# Patient Record
Sex: MALE | Race: WHITE | Employment: FULL TIME | URBAN - METROPOLITAN AREA
[De-identification: names, ages, dates, MRNs, and addresses within clinical notes are randomized per-mention and may not be internally consistent; named-entity substitution may affect disease eponyms.]

---

## 2017-01-01 ENCOUNTER — HOSPITAL ENCOUNTER (OUTPATIENT)
Dept: OTHER | Age: 42
Discharge: HOME | End: 2017-01-31
Attending: EMERGENCY MEDICINE | Admitting: EMERGENCY MEDICINE

## 2018-01-19 ENCOUNTER — OFFICE VISIT (OUTPATIENT)
Dept: INTERNAL MEDICINE CLINIC | Age: 43
End: 2018-01-19

## 2018-01-19 VITALS
SYSTOLIC BLOOD PRESSURE: 130 MMHG | HEART RATE: 82 BPM | WEIGHT: 223.4 LBS | OXYGEN SATURATION: 98 % | HEIGHT: 72 IN | DIASTOLIC BLOOD PRESSURE: 80 MMHG | BODY MASS INDEX: 30.26 KG/M2

## 2018-01-19 DIAGNOSIS — R68.82 DECREASED LIBIDO WITHOUT SEXUAL DYSFUNCTION: ICD-10-CM

## 2018-01-19 DIAGNOSIS — F17.200 CURRENT SMOKER: ICD-10-CM

## 2018-01-19 DIAGNOSIS — R53.82 CHRONIC FATIGUE: ICD-10-CM

## 2018-01-19 DIAGNOSIS — N52.9 ERECTILE DYSFUNCTION, UNSPECIFIED ERECTILE DYSFUNCTION TYPE: ICD-10-CM

## 2018-01-19 DIAGNOSIS — Z00.00 ROUTINE GENERAL MEDICAL EXAMINATION AT HEALTH CARE FACILITY: Primary | ICD-10-CM

## 2018-01-19 PROCEDURE — 99386 PREV VISIT NEW AGE 40-64: CPT | Performed by: NURSE PRACTITIONER

## 2018-01-19 RX ORDER — NICOTINE 21 MG/24HR
1 PATCH, TRANSDERMAL 24 HOURS TRANSDERMAL EVERY 24 HOURS
Qty: 30 PATCH | Refills: 0 | Status: SHIPPED | OUTPATIENT
Start: 2018-01-19 | End: 2018-03-16 | Stop reason: ALTCHOICE

## 2018-01-19 RX ORDER — ACETAMINOPHEN, ASPIRIN AND CAFFEINE 250; 250; 65 MG/1; MG/1; MG/1
1 TABLET, FILM COATED ORAL EVERY 6 HOURS PRN
COMMUNITY
End: 2021-07-27

## 2018-01-19 ASSESSMENT — PATIENT HEALTH QUESTIONNAIRE - PHQ9
1. LITTLE INTEREST OR PLEASURE IN DOING THINGS: 0
SUM OF ALL RESPONSES TO PHQ9 QUESTIONS 1 & 2: 0
SUM OF ALL RESPONSES TO PHQ QUESTIONS 1-9: 0
2. FEELING DOWN, DEPRESSED OR HOPELESS: 0

## 2018-01-19 NOTE — PATIENT INSTRUCTIONS
proven success. Ask your doctor for ideas. You will greatly increase your chances of success if you take medicine as well as get counseling or join a cessation program.  Some of the changes you feel when you first quit tobacco are uncomfortable. Your body will miss the nicotine at first, and you may feel short-tempered and grumpy. You may have trouble sleeping or concentrating. Medicine can help you deal with these symptoms. You may struggle with changing your smoking habits and rituals. The last step is the tricky one: Be prepared for the smoking urge to continue for a time. This is a lot to deal with, but keep at it. You will feel better. Follow-up care is a key part of your treatment and safety. Be sure to make and go to all appointments, and call your doctor if you are having problems. It's also a good idea to know your test results and keep a list of the medicines you take. How can you care for yourself at home? · Ask your family, friends, and coworkers for support. You have a better chance of quitting if you have help and support. · Join a support group, such as Nicotine Anonymous, for people who are trying to quit smoking. · Consider signing up for a smoking cessation program, such as the American Lung Association's Freedom from Smoking program.  · Set a quit date. Pick your date carefully so that it is not right in the middle of a big deadline or stressful time. Once you quit, do not even take a puff. Get rid of all ashtrays and lighters after your last cigarette. Clean your house and your clothes so that they do not smell of smoke. · Learn how to be a nonsmoker. Think about ways you can avoid those things that make you reach for a cigarette. ¨ Avoid situations that put you at greatest risk for smoking. For some people, it is hard to have a drink with friends without smoking. For others, they might skip a coffee break with coworkers who smoke. ¨ Change your daily routine.  Take a different route to work or eat a meal in a different place. · Cut down on stress. Calm yourself or release tension by doing an activity you enjoy, such as reading a book, taking a hot bath, or gardening. · Talk to your doctor or pharmacist about nicotine replacement therapy, which replaces the nicotine in your body. You still get nicotine but you do not use tobacco. Nicotine replacement products help you slowly reduce the amount of nicotine you need. These products come in several forms, many of them available over-the-counter:  ¨ Nicotine patches  ¨ Nicotine gum and lozenges  ¨ Nicotine inhaler  · Ask your doctor about bupropion (Wellbutrin) or varenicline (Chantix), which are prescription medicines. They do not contain nicotine. They help you by reducing withdrawal symptoms, such as stress and anxiety. · Some people find hypnosis, acupuncture, and massage helpful for ending the smoking habit. · Eat a healthy diet and get regular exercise. Having healthy habits will help your body move past its craving for nicotine. · Be prepared to keep trying. Most people are not successful the first few times they try to quit. Do not get mad at yourself if you smoke again. Make a list of things you learned and think about when you want to try again, such as next week, next month, or next year. Where can you learn more? Go to https://[place].Tellme. org and sign in to your [employer] account. Enter D573 in the [facility] box to learn more about \"Stopping Smoking: Care Instructions. \"     If you do not have an account, please click on the \"Sign Up Now\" link. Current as of: March 20, 2017  Content Version: 11.5  © 8246-9622 Healthwise, Incorporated. Care instructions adapted under license by [facility] ([facility]). If you have questions about a medical condition or this instruction, always ask your healthcare professional. [address] any warranty or liability for your use of this information.

## 2018-01-19 NOTE — PROGRESS NOTES
Arslan Lombardi   40 y.o.  male  [id number]      Chief Complaint   Patient presents with    New Patient     pt here to establish care-previously saw Dr. Shantelle Davalos <1yr ago      Subjective:  40 y.o.male is here as a New Patient to establish long-term care. He was previously seen by Dr. Shantelle Davalos. He is a . He has the following chronic/acute medical problems:      Patient Active Problem List   Diagnosis    Current smoker     Patient with complaints of fatigue, decreased libido, erectile dysfunction and \"feels like my body took a change\". Also with complaints of snoring. Onset was about 5 years ago. Denies fevers, shortness of breath, nausea, vomiting, night sweats. Feels like it may be his testosterone levels. Review of Systems    Current Outpatient Prescriptions   Medication Sig Dispense Refill    aspirin-acetaminophen-caffeine (EXCEDRIN MIGRAINE) 250-250-65 MG per tablet Take 1 tablet by mouth every 6 hours as needed for Headaches      nicotine (NICODERM CQ) 14 MG/24HR Place 1 patch onto the skin every 24 hours 30 patch 0     No current facility-administered medications for this visit. Allergies   Allergen Reactions    Percocet [Oxycodone-Acetaminophen] Hives     Past Medical History:   Diagnosis Date    Hypertension      Past Surgical History:   Procedure Laterality Date    ARM SURGERY Right 05/2017    muscle tear repair    TONSILLECTOMY      VASECTOMY       Family History   Problem Relation Age of Onset    Diabetes Mother     High Blood Pressure Mother     No Known Problems Sister     No Known Problems Brother     No Known Problems Sister     No Known Problems Brother      Social History     Social History    Marital status:      Spouse name: N/A    Number of children: N/A    Years of education: N/A     Occupational History    Not on file.      Social History Main Topics    Smoking status: Current Every Day Smoker     Packs/day: 0.75     Types: Cigarettes     Start Results   Component Value Date    WBC 7.7 10/26/2016    HGB 15.2 10/26/2016    HCT 45.2 10/26/2016    MCV 85.4 10/26/2016     10/26/2016     Lab Results   Component Value Date     10/26/2016    K 5.0 10/26/2016    CL 99 10/26/2016    CO2 28 10/26/2016    BUN 13 10/26/2016    CREATININE 1.2 10/26/2016    CALCIUM 9.8 10/26/2016    PROT 6.8 10/26/2016    LABALBU 4.7 10/26/2016    BILITOT 0.5 10/26/2016    ALKPHOS 48 10/26/2016    AST 22 10/26/2016    ALT 35 10/26/2016    LABGLOM >60 10/26/2016    GFRAA >60 10/26/2016     Lab Results   Component Value Date    CHOL 220 (H) 10/26/2016    CHOL 190 01/15/2016    CHOL 203 (H) 06/15/2015     Lab Results   Component Value Date    TRIG 192 (H) 10/26/2016    TRIG 221 (H) 01/15/2016    TRIG 259 (H) 06/15/2015     Lab Results   Component Value Date    HDL 47 10/26/2016    HDL 37 (L) 01/15/2016    HDL 37 (L) 06/15/2015     No results found for: LDLCALC, LDLCHOLESTEROL  No results found for: LABA1C  Lab Results   Component Value Date    TSHHS 1.670 10/26/2016       ASSESSMENT:      1. Routine general medical examination at health care facility    2. Chronic fatigue    3. Erectile dysfunction, unspecified erectile dysfunction type    4. Decreased libido without sexual dysfunction    5. Current smoker      PLAN:  Will get labs. Counseling on the following given:  nutrition, exercise and tobacco cessation. Preventative health discussed, recommend annual wellness visits. Refused pneumococcal and influenza vaccine. Would like to get Dtap at next visit. Yearly dental and eye exams recommended. May need referral to urology for ED. Also discussed sleep study may be warranted to assess for sleep apnea which could also cause his symptoms. However will start with labs first.  Pt motivated to stop smoking. WIll trial nicotine patches. Care discussed with patient. Questions answered. Patient verbalizes understanding and agrees with plan. After visit summary provided.

## 2018-01-23 DIAGNOSIS — R53.82 CHRONIC FATIGUE: ICD-10-CM

## 2018-01-23 DIAGNOSIS — Z00.00 ROUTINE GENERAL MEDICAL EXAMINATION AT HEALTH CARE FACILITY: ICD-10-CM

## 2018-01-23 DIAGNOSIS — R68.82 DECREASED LIBIDO WITHOUT SEXUAL DYSFUNCTION: ICD-10-CM

## 2018-01-23 LAB
A/G RATIO: 2.4 (ref 1.1–2.2)
ALBUMIN SERPL-MCNC: 4.8 G/DL (ref 3.4–5)
ALP BLD-CCNC: 55 U/L (ref 40–129)
ALT SERPL-CCNC: 23 U/L (ref 10–40)
ANION GAP SERPL CALCULATED.3IONS-SCNC: 12 MMOL/L (ref 3–16)
AST SERPL-CCNC: 16 U/L (ref 15–37)
BILIRUB SERPL-MCNC: 0.4 MG/DL (ref 0–1)
BUN BLDV-MCNC: 11 MG/DL (ref 7–20)
CALCIUM SERPL-MCNC: 9.6 MG/DL (ref 8.3–10.6)
CHLORIDE BLD-SCNC: 101 MMOL/L (ref 99–110)
CHOLESTEROL, TOTAL: 196 MG/DL (ref 0–199)
CO2: 28 MMOL/L (ref 21–32)
CREAT SERPL-MCNC: 0.9 MG/DL (ref 0.9–1.3)
GFR AFRICAN AMERICAN: >60
GFR NON-AFRICAN AMERICAN: >60
GLOBULIN: 2 G/DL
GLUCOSE BLD-MCNC: 111 MG/DL (ref 70–99)
HCT VFR BLD CALC: 47.6 % (ref 40.5–52.5)
HDLC SERPL-MCNC: 36 MG/DL (ref 40–60)
HEMOGLOBIN: 16.4 G/DL (ref 13.5–17.5)
LDL CHOLESTEROL CALCULATED: 117 MG/DL
MCH RBC QN AUTO: 29.6 PG (ref 26–34)
MCHC RBC AUTO-ENTMCNC: 34.3 G/DL (ref 31–36)
MCV RBC AUTO: 86.1 FL (ref 80–100)
PDW BLD-RTO: 13.5 % (ref 12.4–15.4)
PLATELET # BLD: 303 K/UL (ref 135–450)
PMV BLD AUTO: 8.7 FL (ref 5–10.5)
POTASSIUM SERPL-SCNC: 4.3 MMOL/L (ref 3.5–5.1)
RBC # BLD: 5.53 M/UL (ref 4.2–5.9)
SODIUM BLD-SCNC: 141 MMOL/L (ref 136–145)
TOTAL PROTEIN: 6.8 G/DL (ref 6.4–8.2)
TRIGL SERPL-MCNC: 214 MG/DL (ref 0–150)
TSH SERPL DL<=0.05 MIU/L-ACNC: 1.64 UIU/ML (ref 0.27–4.2)
VITAMIN D 25-HYDROXY: 21.2 NG/ML
VLDLC SERPL CALC-MCNC: 43 MG/DL
WBC # BLD: 7.2 K/UL (ref 4–11)

## 2018-01-25 LAB
SEX HORMONE BINDING GLOBULIN: 25 NMOL/L (ref 11–80)
TESTOSTERONE FREE-NONMALE: 81.8 PG/ML (ref 47–244)
TESTOSTERONE TOTAL: 351 NG/DL (ref 220–1000)

## 2018-03-16 ENCOUNTER — OFFICE VISIT (OUTPATIENT)
Dept: INTERNAL MEDICINE CLINIC | Age: 43
End: 2018-03-16

## 2018-03-16 VITALS
OXYGEN SATURATION: 97 % | BODY MASS INDEX: 25.95 KG/M2 | WEIGHT: 190 LBS | DIASTOLIC BLOOD PRESSURE: 72 MMHG | SYSTOLIC BLOOD PRESSURE: 118 MMHG | HEART RATE: 64 BPM

## 2018-03-16 DIAGNOSIS — K43.9 EPIGASTRIC HERNIA: Primary | ICD-10-CM

## 2018-03-16 DIAGNOSIS — R68.82 DECREASED LIBIDO: ICD-10-CM

## 2018-03-16 PROCEDURE — 99213 OFFICE O/P EST LOW 20 MIN: CPT | Performed by: NURSE PRACTITIONER

## 2018-03-16 NOTE — PROGRESS NOTES
Renny Bullock   44 y.o.  male  [id number]    Chief Complaint   Patient presents with    Other     8 week f/u-wants to discuss vitamins        Subjective:  43 y.o.male is here for a follow up. He has the following chronic/acute medical problems:     Patient Active Problem List   Diagnosis    Epigastric hernia    Decreased libido     Patient stopped smoking 2 months ago and started working out/eating healthier. He has lost 33lbs since last OV. He states he is sleeping better and stopped snoring. He is still having problems with low libido and ED. He would like to avoid medication and is asking about P6 to help increase testosterone. Patient also with complaints of a \"bulge\" in his epigastric area. States he has felt is for awhile but started to notice it more since loosing weight. Denies pain, nausea or vomiting. Review of Systems    Current Outpatient Prescriptions   Medication Sig Dispense Refill    aspirin-acetaminophen-caffeine (EXCEDRIN MIGRAINE) 250-250-65 MG per tablet Take 1 tablet by mouth every 6 hours as needed for Headaches       No current facility-administered medications for this visit. Objective:  /72 (Site: Left Arm, Position: Sitting, Cuff Size: Medium Adult)   Pulse 64   Wt 190 lb (86.2 kg)   SpO2 97%   BMI 25.95 kg/m²   BP Readings from Last 3 Encounters:   03/16/18 118/72   01/19/18 130/80   08/18/14 (!) 146/101     Wt Readings from Last 3 Encounters:   03/16/18 190 lb (86.2 kg)   01/19/18 223 lb 6.4 oz (101.3 kg)   08/18/14 218 lb (98.9 kg)       Physical Exam   Constitutional: He is oriented to person, place, and time. He appears well-developed and well-nourished. Eyes: Conjunctivae are normal. No scleral icterus. Cardiovascular: Normal rate, regular rhythm and normal heart sounds. No murmur heard. Pulmonary/Chest: Effort normal and breath sounds normal. No respiratory distress. He has no wheezes. Abdominal: Soft. A hernia is present. 9/16/2018).     Xiomara Lawson NP  03/18/18  4:20 PM

## 2018-03-18 PROBLEM — R68.82 DECREASED LIBIDO: Status: ACTIVE | Noted: 2018-03-18

## 2018-03-18 PROBLEM — F17.200 CURRENT SMOKER: Status: RESOLVED | Noted: 2018-01-19 | Resolved: 2018-03-18

## 2018-03-18 PROBLEM — K43.9 EPIGASTRIC HERNIA: Status: ACTIVE | Noted: 2018-03-18

## 2018-03-19 ENCOUNTER — TELEPHONE (OUTPATIENT)
Dept: INTERNAL MEDICINE CLINIC | Age: 43
End: 2018-03-19

## 2018-04-13 ENCOUNTER — E-VISIT (OUTPATIENT)
Dept: FAMILY MEDICINE CLINIC | Age: 43
End: 2018-04-13
Payer: COMMERCIAL

## 2018-04-13 DIAGNOSIS — J06.9 UPPER RESPIRATORY TRACT INFECTION, UNSPECIFIED TYPE: Primary | ICD-10-CM

## 2018-04-13 DIAGNOSIS — B34.9 VIRAL ILLNESS: Primary | ICD-10-CM

## 2018-04-13 PROCEDURE — 98969 PR NONPHYSICIAN ONLINE ASSESSMENT AND MANAGEMENT: CPT | Performed by: NURSE PRACTITIONER

## 2018-04-13 RX ORDER — CETIRIZINE HYDROCHLORIDE 10 MG/1
10 TABLET ORAL DAILY
Qty: 30 TABLET | Refills: 0 | Status: SHIPPED | OUTPATIENT
Start: 2018-04-13 | End: 2021-07-27

## 2018-04-13 RX ORDER — FLUTICASONE PROPIONATE 50 MCG
1 SPRAY, SUSPENSION (ML) NASAL DAILY
Qty: 1 BOTTLE | Refills: 0 | Status: SHIPPED | OUTPATIENT
Start: 2018-04-13 | End: 2021-07-27

## 2018-04-13 RX ORDER — AMOXICILLIN 500 MG/1
1 TABLET, FILM COATED ORAL 2 TIMES DAILY
Qty: 14 TABLET | Refills: 0 | Status: SHIPPED | OUTPATIENT
Start: 2018-04-13 | End: 2018-04-20

## 2018-04-13 ASSESSMENT — LIFESTYLE VARIABLES: SMOKING_STATUS: NO

## 2018-11-24 ENCOUNTER — APPOINTMENT (OUTPATIENT)
Dept: CT IMAGING | Age: 43
End: 2018-11-24
Payer: COMMERCIAL

## 2018-11-24 ENCOUNTER — APPOINTMENT (OUTPATIENT)
Dept: GENERAL RADIOLOGY | Age: 43
End: 2018-11-24
Payer: COMMERCIAL

## 2018-11-24 ENCOUNTER — HOSPITAL ENCOUNTER (EMERGENCY)
Age: 43
Discharge: HOME | End: 2018-11-24
Payer: COMMERCIAL

## 2018-11-24 VITALS
WEIGHT: 214 LBS | RESPIRATION RATE: 18 BRPM | HEART RATE: 77 BPM | OXYGEN SATURATION: 98 % | SYSTOLIC BLOOD PRESSURE: 156 MMHG | DIASTOLIC BLOOD PRESSURE: 102 MMHG | BODY MASS INDEX: 28.99 KG/M2 | HEIGHT: 72 IN | TEMPERATURE: 98.5 F

## 2018-11-24 DIAGNOSIS — R10.9 FLANK PAIN: ICD-10-CM
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
## 2018-11-24 DIAGNOSIS — S16.1XXA STRAIN OF NECK MUSCLE, INITIAL ENCOUNTER: ICD-10-CM

## 2018-11-24 DIAGNOSIS — R07.81 RIB PAIN ON RIGHT SIDE: ICD-10-CM

## 2018-11-24 DIAGNOSIS — M25.551 RIGHT HIP PAIN: ICD-10-CM

## 2018-11-24 DIAGNOSIS — S09.90XA INJURY OF HEAD, INITIAL ENCOUNTER: ICD-10-CM

## 2018-11-24 PROCEDURE — 99284 EMERGENCY DEPT VISIT MOD MDM: CPT

## 2018-11-24 PROCEDURE — 93005 ELECTROCARDIOGRAM TRACING: CPT | Performed by: PHYSICIAN ASSISTANT

## 2018-11-24 PROCEDURE — 73501 X-RAY EXAM HIP UNI 1 VIEW: CPT

## 2018-11-24 PROCEDURE — 70450 CT HEAD/BRAIN W/O DYE: CPT

## 2018-11-24 PROCEDURE — 72125 CT NECK SPINE W/O DYE: CPT

## 2018-11-24 PROCEDURE — 71101 X-RAY EXAM UNILAT RIBS/CHEST: CPT

## 2018-11-24 RX ORDER — CYCLOBENZAPRINE HCL 10 MG
10 TABLET ORAL 3 TIMES DAILY PRN
Qty: 20 TABLET | Refills: 0 | Status: SHIPPED | OUTPATIENT
Start: 2018-11-24 | End: 2018-12-04

## 2018-11-24 RX ORDER — IBUPROFEN 600 MG/1
600 TABLET ORAL EVERY 6 HOURS PRN
Qty: 28 TABLET | Refills: 0 | Status: SHIPPED | OUTPATIENT
Start: 2018-11-24 | End: 2020-06-29

## 2018-11-24 ASSESSMENT — PAIN DESCRIPTION - LOCATION: LOCATION: CHEST;HIP

## 2018-11-24 ASSESSMENT — PAIN DESCRIPTION - PAIN TYPE: TYPE: ACUTE PAIN

## 2018-11-24 ASSESSMENT — PAIN SCALES - GENERAL: PAINLEVEL_OUTOF10: 8
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
## 2018-11-25 PROCEDURE — 93010 ELECTROCARDIOGRAM REPORT: CPT | Performed by: INTERNAL MEDICINE

## 2018-11-28 LAB
EKG ATRIAL RATE: 78 BPM
EKG DIAGNOSIS: NORMAL
EKG P AXIS: 40 DEGREES
EKG P-R INTERVAL: 196 MS
EKG Q-T INTERVAL: 366 MS
EKG QRS DURATION: 92 MS
EKG QTC CALCULATION (BAZETT): 417 MS
EKG R AXIS: 4 DEGREES
EKG T AXIS: 34 DEGREES
EKG VENTRICULAR RATE: 78 BPM

## 2018-12-11 ENCOUNTER — TELEPHONE (OUTPATIENT)
Dept: INTERNAL MEDICINE CLINIC | Age: 43
End: 2018-12-11

## 2020-06-29 ENCOUNTER — HOSPITAL ENCOUNTER (EMERGENCY)
Age: 45
Discharge: HOME | End: 2020-06-29
Payer: COMMERCIAL

## 2020-06-29 ENCOUNTER — APPOINTMENT (OUTPATIENT)
Dept: GENERAL RADIOLOGY | Age: 45
End: 2020-06-29
Payer: COMMERCIAL

## 2020-06-29 VITALS
SYSTOLIC BLOOD PRESSURE: 165 MMHG | BODY MASS INDEX: 30.34 KG/M2 | TEMPERATURE: 98.1 F | DIASTOLIC BLOOD PRESSURE: 106 MMHG | RESPIRATION RATE: 16 BRPM | HEIGHT: 72 IN | WEIGHT: 224 LBS | HEART RATE: 79 BPM | OXYGEN SATURATION: 98 %

## 2020-06-29 LAB
ALBUMIN SERPL-MCNC: 4.3 GM/DL (ref 3.4–5)
ALP BLD-CCNC: 61 IU/L (ref 40–129)
ALT SERPL-CCNC: 16 U/L (ref 10–40)
ANION GAP SERPL CALCULATED.3IONS-SCNC: 11 MMOL/L (ref 4–16)
AST SERPL-CCNC: 16 IU/L (ref 15–37)
BACTERIA: NEGATIVE /HPF
BASOPHILS ABSOLUTE: 0.1 K/CU MM
BASOPHILS RELATIVE PERCENT: 0.7 % (ref 0–1)
BILIRUB SERPL-MCNC: 0.3 MG/DL (ref 0–1)
BILIRUBIN URINE: NEGATIVE MG/DL
BLOOD, URINE: NEGATIVE
BUN BLDV-MCNC: 13 MG/DL (ref 6–23)
CALCIUM SERPL-MCNC: 9.7 MG/DL (ref 8.3–10.6)
CHLORIDE BLD-SCNC: 100 MMOL/L (ref 99–110)
CLARITY: CLEAR
CO2: 25 MMOL/L (ref 21–32)
COLOR: ABNORMAL
CREAT SERPL-MCNC: 1 MG/DL (ref 0.9–1.3)
DIFFERENTIAL TYPE: ABNORMAL
EOSINOPHILS ABSOLUTE: 0.2 K/CU MM
EOSINOPHILS RELATIVE PERCENT: 1.8 % (ref 0–3)
GFR AFRICAN AMERICAN: >60 ML/MIN/1.73M2
GFR NON-AFRICAN AMERICAN: >60 ML/MIN/1.73M2
GLUCOSE BLD-MCNC: 106 MG/DL (ref 70–99)
GLUCOSE, URINE: NEGATIVE MG/DL
HCT VFR BLD CALC: 45.4 % (ref 42–52)
HEMOGLOBIN: 15 GM/DL (ref 13.5–18)
IMMATURE NEUTROPHIL %: 0.4 % (ref 0–0.43)
KETONES, URINE: NEGATIVE MG/DL
LEUKOCYTE ESTERASE, URINE: NEGATIVE
LIPASE: 19 IU/L (ref 13–60)
LYMPHOCYTES ABSOLUTE: 1.7 K/CU MM
LYMPHOCYTES RELATIVE PERCENT: 19.5 % (ref 24–44)
MAGNESIUM: 2.1 MG/DL (ref 1.8–2.4)
MCH RBC QN AUTO: 28.4 PG (ref 27–31)
MCHC RBC AUTO-ENTMCNC: 33 % (ref 32–36)
MCV RBC AUTO: 86 FL (ref 78–100)
MONOCYTES ABSOLUTE: 0.7 K/CU MM
MONOCYTES RELATIVE PERCENT: 8.3 % (ref 0–4)
MUCUS: ABNORMAL HPF
NITRITE URINE, QUANTITATIVE: NEGATIVE
NUCLEATED RBC %: 0 %
PDW BLD-RTO: 12.2 % (ref 11.7–14.9)
PH, URINE: 6 (ref 5–8)
PLATELET # BLD: 337 K/CU MM (ref 140–440)
PMV BLD AUTO: 9 FL (ref 7.5–11.1)
POTASSIUM SERPL-SCNC: 4.3 MMOL/L (ref 3.5–5.1)
PRO-BNP: 43.63 PG/ML
PROTEIN UA: NEGATIVE MG/DL
RBC # BLD: 5.28 M/CU MM (ref 4.6–6.2)
RBC URINE: ABNORMAL /HPF (ref 0–3)
SEGMENTED NEUTROPHILS ABSOLUTE COUNT: 5.9 K/CU MM
SEGMENTED NEUTROPHILS RELATIVE PERCENT: 69.3 % (ref 36–66)
SODIUM BLD-SCNC: 136 MMOL/L (ref 135–145)
SPECIFIC GRAVITY UA: 1 (ref 1–1.03)
TOTAL CK: 102 IU/L (ref 38–174)
TOTAL IMMATURE NEUTOROPHIL: 0.03 K/CU MM
TOTAL NUCLEATED RBC: 0 K/CU MM
TOTAL PROTEIN: 7.7 GM/DL (ref 6.4–8.2)
TRICHOMONAS: ABNORMAL /HPF
TROPONIN T: <0.01 NG/ML
UROBILINOGEN, URINE: NORMAL MG/DL (ref 0.2–1)
WBC # BLD: 8.5 K/CU MM (ref 4–10.5)
WBC UA: ABNORMAL /HPF (ref 0–2)

## 2020-06-29 PROCEDURE — 81001 URINALYSIS AUTO W/SCOPE: CPT

## 2020-06-29 PROCEDURE — 83735 ASSAY OF MAGNESIUM: CPT

## 2020-06-29 PROCEDURE — 84443 ASSAY THYROID STIM HORMONE: CPT

## 2020-06-29 PROCEDURE — 93005 ELECTROCARDIOGRAM TRACING: CPT | Performed by: EMERGENCY MEDICINE

## 2020-06-29 PROCEDURE — 96374 THER/PROPH/DIAG INJ IV PUSH: CPT

## 2020-06-29 PROCEDURE — 85025 COMPLETE CBC W/AUTO DIFF WBC: CPT

## 2020-06-29 PROCEDURE — 2580000003 HC RX 258: Performed by: PHYSICIAN ASSISTANT

## 2020-06-29 PROCEDURE — 82550 ASSAY OF CK (CPK): CPT

## 2020-06-29 PROCEDURE — 71045 X-RAY EXAM CHEST 1 VIEW: CPT

## 2020-06-29 PROCEDURE — 80053 COMPREHEN METABOLIC PANEL: CPT

## 2020-06-29 PROCEDURE — 84484 ASSAY OF TROPONIN QUANT: CPT

## 2020-06-29 PROCEDURE — 6360000002 HC RX W HCPCS: Performed by: PHYSICIAN ASSISTANT

## 2020-06-29 PROCEDURE — 99283 EMERGENCY DEPT VISIT LOW MDM: CPT

## 2020-06-29 PROCEDURE — 83880 ASSAY OF NATRIURETIC PEPTIDE: CPT

## 2020-06-29 PROCEDURE — 83690 ASSAY OF LIPASE: CPT

## 2020-06-29 RX ORDER — KETOROLAC TROMETHAMINE 10 MG/1
10 TABLET, FILM COATED ORAL EVERY 6 HOURS PRN
Qty: 20 TABLET | Refills: 0 | Status: SHIPPED | OUTPATIENT
Start: 2020-06-29 | End: 2021-07-27 | Stop reason: ALTCHOICE

## 2020-06-29 RX ORDER — 0.9 % SODIUM CHLORIDE 0.9 %
1000 INTRAVENOUS SOLUTION INTRAVENOUS ONCE
Status: COMPLETED | OUTPATIENT
Start: 2020-06-29 | End: 2020-06-29

## 2020-06-29 RX ORDER — KETOROLAC TROMETHAMINE 30 MG/ML
15 INJECTION, SOLUTION INTRAMUSCULAR; INTRAVENOUS ONCE
Status: COMPLETED | OUTPATIENT
Start: 2020-06-29 | End: 2020-06-29

## 2020-06-29 RX ORDER — KETOROLAC TROMETHAMINE 10 MG/1
10 TABLET, FILM COATED ORAL EVERY 6 HOURS PRN
Qty: 20 TABLET | Refills: 0 | Status: SHIPPED | OUTPATIENT
Start: 2020-06-29 | End: 2020-06-29 | Stop reason: SDUPTHER

## 2020-06-29 RX ADMIN — KETOROLAC TROMETHAMINE 15 MG: 30 INJECTION, SOLUTION INTRAMUSCULAR at 11:43

## 2020-06-29 RX ADMIN — SODIUM CHLORIDE 1000 ML: 9 INJECTION, SOLUTION INTRAVENOUS at 11:43

## 2020-06-29 ASSESSMENT — PAIN DESCRIPTION - PAIN TYPE: TYPE: ACUTE PAIN

## 2020-06-29 ASSESSMENT — PAIN SCALES - GENERAL
PAINLEVEL_OUTOF10: 9
PAINLEVEL_OUTOF10: 9

## 2020-06-29 NOTE — ED PROVIDER NOTES
Patient Identification  Lan Marcos is a 41 y.o. male    Chief Complaint  Spasms (states a week ago that he was outside working and feels that he was dehydrated; started having these muscle cramps primarily in legs and abdominal area and states that they have continued) and Chest Pain (states that he is having muscle cramps in his chest; states that it feels tight)      HPI  (History provided by patient)  This is a 41 y.o. male who was brought in by self for chief complaint of muscle cramps, chest pain. Patient reports a week ago he was de-constructing a barn, states that it was very hot outside, he felt as though he is exerting himself significantly, wife came out and told him to come inside because he \"did not look good\". States that after he got inside he began having cramping in his legs and abdomen intermittently. He reports he is continued to have cramps in his legs and abdomen as well as throughout his chest since that time that are waxing and waning but constant. He has been hydrating at home, urine is light yellow, no syncope, has been drinking Pedialyte. Cramps are worse when he tries to go to work. He denies any medical problems. Pain is worst in the right leg and chest, 9 out of 10, cramping. He has tried Tylenol and ibuprofen and muscle relaxers and lidocaine patches without relief. REVIEW OF SYSTEMS    Constitutional:  Denies fever, chills  HENT:  Denies sore throat or ear pain   Eyes: Denies vision changes, eye pain  Cardiovascular:  Denies syncope.  + CP  Respiratory:  Denies shortness of breath, cough   GI:  Denies nausea, vomiting.  + abdominal pain  :  Denies dysuria, discharge  Musculoskeletal:  + back pain, leg pain  Skin:  Denies rash, pruritis  Neurologic:  Denies headache, focal weakness, or sensory changes     See HPI and nursing notes for additional information     I have reviewed the following nursing documentation:  Allergies:    Allergies   Allergen Reactions    Supple. Trachea midline. Cardiovascular: RRR, no murmurs, rubs, or gallops, radial pulses 2+ bilaterally. Pulmonary/Chest: Effort normal. No respiratory distress. CTAB. No stridor. No wheezes. No rales. Abdominal: Soft. Tender to palpation in right upper quadrant and right flank. No distension. No guarding, rebound tenderness, or evidence of ascites. : No CVA tenderness. Musculoskeletal: Moves all extremities. No gross deformity. Tender to palpation throughout the right leg, the bilateral lower back, right flank, right chest wall which directly reproduces patient's pain. Neurological: Alert and oriented to person, place, and time. Normal muscle tone. Skin: Warm and dry. No rash. Psychiatric: Normal mood and affect. Behavior is normal.      EKG   Please see Dr. Sierra Rice note for EKG read. Radiographs (if obtained):  [] The following radiograph was interpreted by myself in the absence of a radiologist:   [x] Radiologist's Report Reviewed:  XR CHEST PORTABLE   Final Result   1. No acute cardiopulmonary disease.                 Labs  Results for orders placed or performed during the hospital encounter of 06/29/20   CBC auto diff   Result Value Ref Range    WBC 8.5 4.0 - 10.5 K/CU MM    RBC 5.28 4.6 - 6.2 M/CU MM    Hemoglobin 15.0 13.5 - 18.0 GM/DL    Hematocrit 45.4 42 - 52 %    MCV 86.0 78 - 100 FL    MCH 28.4 27 - 31 PG    MCHC 33.0 32.0 - 36.0 %    RDW 12.2 11.7 - 14.9 %    Platelets 101 889 - 064 K/CU MM    MPV 9.0 7.5 - 11.1 FL    Differential Type AUTOMATED DIFFERENTIAL     Segs Relative 69.3 (H) 36 - 66 %    Lymphocytes % 19.5 (L) 24 - 44 %    Monocytes % 8.3 (H) 0 - 4 %    Eosinophils % 1.8 0 - 3 %    Basophils % 0.7 0 - 1 %    Segs Absolute 5.9 K/CU MM    Lymphocytes Absolute 1.7 K/CU MM    Monocytes Absolute 0.7 K/CU MM    Eosinophils Absolute 0.2 K/CU MM    Basophils Absolute 0.1 K/CU MM    Nucleated RBC % 0.0 %    Total Nucleated RBC 0.0 K/CU MM    Total Immature Neutrophil sinus rhythm  Normal ECG  When compared with ECG of 24-NOV-2018 15:01,  No significant change was found           MDM  Patient presents for diffuse body pain most prominent in the right leg, flank, chest.  Started after exerting himself while taking down a barn, pain reproduced on exam.  Workup here reveals no evidence of cardiac injury, JUAN M, electrolyte abnormality, rhabdomyolysis. Discussed results with patient, he is feeling much better after Toradol and fluids, plan is to discharge with oral Toradol, increase fluids, instructed on rest.  Clinical impression is musculoskeletal pain from overexertion. I estimate there is LOW risk for PULMONARY EMBOLISM, ACUTE CORONARY SYNDROME, CARDIAC TAMPONADE, PNEUMOTHORAX, PNEUMONIA, PERICARDITIS, OR THORACIC AORTIC DISSECTION, thus I consider the discharge disposition reasonable. Beatrice Sylvester and I have discussed the diagnosis and risks, and we agree with discharging home to follow-up with their primary doctor. We also discussed returning to the Emergency Department immediately if new or worsening symptoms occur. We have discussed the symptoms which are most concerning (e.g., bloody sputum, fever, worsening pain or worsening shortness of breath, vomiting, sweating) that necessitate immediate return. I have independently evaluated this patient. Final Impression  1. Musculoskeletal pain        Blood pressure (!) 165/106, pulse 79, temperature 98.1 °F (36.7 °C), temperature source Oral, resp. rate 16, height 6' (1.829 m), weight 224 lb (101.6 kg), SpO2 98 %. Disposition:  Discharge to home in stable condition. Patient was given scripts for the following medications. I counseled patient how to take these medications. Discharge Medication List as of 6/29/2020  2:03 PM          This chart was generated using the [address] [place] [address] dictation system. I created this record but it may contain dictation errors given the limitations of this technology.        Tessie Martinez GINA Spence  06/29/20 1559

## 2020-06-30 PROCEDURE — 93010 ELECTROCARDIOGRAM REPORT: CPT | Performed by: INTERNAL MEDICINE

## 2020-07-07 LAB
EKG ATRIAL RATE: 83 BPM
EKG DIAGNOSIS: NORMAL
EKG P AXIS: 38 DEGREES
EKG P-R INTERVAL: 182 MS
EKG Q-T INTERVAL: 334 MS
EKG QRS DURATION: 88 MS
EKG QTC CALCULATION (BAZETT): 392 MS
EKG R AXIS: -4 DEGREES
EKG T AXIS: 52 DEGREES
EKG VENTRICULAR RATE: 83 BPM

## 2021-07-27 ENCOUNTER — OFFICE VISIT (OUTPATIENT)
Dept: INTERNAL MEDICINE CLINIC | Age: 46
End: 2021-07-27
Payer: COMMERCIAL

## 2021-07-27 VITALS
OXYGEN SATURATION: 96 % | HEART RATE: 89 BPM | SYSTOLIC BLOOD PRESSURE: 130 MMHG | BODY MASS INDEX: 31.28 KG/M2 | DIASTOLIC BLOOD PRESSURE: 100 MMHG | HEIGHT: 73 IN | WEIGHT: 236 LBS

## 2021-07-27 DIAGNOSIS — E55.9 VITAMIN D DEFICIENCY: ICD-10-CM

## 2021-07-27 DIAGNOSIS — E78.2 MIXED HYPERLIPIDEMIA: ICD-10-CM

## 2021-07-27 DIAGNOSIS — Z12.11 COLON CANCER SCREENING: ICD-10-CM

## 2021-07-27 DIAGNOSIS — R03.0 WHITE COAT SYNDROME WITHOUT HYPERTENSION: Primary | ICD-10-CM

## 2021-07-27 DIAGNOSIS — E07.9 THYROID DISORDER: ICD-10-CM

## 2021-07-27 PROCEDURE — 99204 OFFICE O/P NEW MOD 45 MIN: CPT | Performed by: INTERNAL MEDICINE

## 2021-07-27 SDOH — ECONOMIC STABILITY: FOOD INSECURITY: WITHIN THE PAST 12 MONTHS, THE FOOD YOU BOUGHT JUST DIDN'T LAST AND YOU DIDN'T HAVE MONEY TO GET MORE.: NEVER TRUE

## 2021-07-27 SDOH — ECONOMIC STABILITY: TRANSPORTATION INSECURITY
IN THE PAST 12 MONTHS, HAS THE LACK OF TRANSPORTATION KEPT YOU FROM MEDICAL APPOINTMENTS OR FROM GETTING MEDICATIONS?: YES

## 2021-07-27 SDOH — ECONOMIC STABILITY: TRANSPORTATION INSECURITY
IN THE PAST 12 MONTHS, HAS LACK OF TRANSPORTATION KEPT YOU FROM MEETINGS, WORK, OR FROM GETTING THINGS NEEDED FOR DAILY LIVING?: YES

## 2021-07-27 SDOH — ECONOMIC STABILITY: FOOD INSECURITY: WITHIN THE PAST 12 MONTHS, YOU WORRIED THAT YOUR FOOD WOULD RUN OUT BEFORE YOU GOT MONEY TO BUY MORE.: NEVER TRUE

## 2021-07-27 ASSESSMENT — SOCIAL DETERMINANTS OF HEALTH (SDOH): HOW HARD IS IT FOR YOU TO PAY FOR THE VERY BASICS LIKE FOOD, HOUSING, MEDICAL CARE, AND HEATING?: NOT HARD AT ALL

## 2021-07-27 ASSESSMENT — PATIENT HEALTH QUESTIONNAIRE - PHQ9
SUM OF ALL RESPONSES TO PHQ9 QUESTIONS 1 & 2: 0
SUM OF ALL RESPONSES TO PHQ QUESTIONS 1-9: 0
SUM OF ALL RESPONSES TO PHQ QUESTIONS 1-9: 0
2. FEELING DOWN, DEPRESSED OR HOPELESS: 0
SUM OF ALL RESPONSES TO PHQ QUESTIONS 1-9: 0
1. LITTLE INTEREST OR PLEASURE IN DOING THINGS: 0

## 2021-07-27 NOTE — PROGRESS NOTES
7/27/21    Jose Kelley  1975    Chief Complaint   Patient presents with    New Patient       History of Present Illness:  Jose Kelley is a 40 y.o. pleasant gentleman presenting today to establish care as a new patient with a chief complaint of high BP. He has a past medical history significant for:  HL (,  on 1/23/18), not on statin   Vitamin D deficiency (level 21.2 on 1/23/18), not on supplementation   Obesity (BMI 31)   COVID-19 (02/2021)  S/p tonsillectomy  S/p vasectomy   Former smoker (quit 2018)     # BP today 130/100. Patient has h/o high BP readings on chart review. Used to be on Lisinopril in 2014. Reports at home his BP is not high. Usually higher in physicians' offices. # Patient with HL. Not on statin. Labs not done since 2018. # Not UTD on colon cancer screening. # Not UTD on COVID-19 vaccination per choice.       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[employer]       Health maintenance:   Health Maintenance Due   Topic Date Due    Hepatitis C screen  Never done    COVID-19 Vaccine (1) Never done    HIV screen  Never done    DTaP/Tdap/Td vaccine (1 - Tdap) Never done    Diabetes screen  10/26/2019    Colon Cancer Screen FIT/FOBT  02/04/2021         Review of Systems:  Constitutional: no fevers, no chills, no night sweats, no weight loss, no weight gain, no fatigue   Pain assessment: no pain  Head: no headaches  Ears: no hearing loss, no tinnitus, no vertigo  Eyes: no blurry vision, no diplopia, no dryness, no itchiness  Mouth: no oral ulcers, no dry mouth, no sore throat  Nose: no nasal congestion, no epistaxis  Cardiac: no chest pain, no palpitations, no leg swelling, no orthopnea, no PND, no syncope  Pulmonary: no dyspnea, no cough, no wheezing, no hemoptysis  GI: no nausea, no vomiting, no diarrhea, no constipation, no abdominal pain, no hematochezia  : no dysuria, no frequency, no urgency, no hematuria, no frothy urine  MSK: no arthralgias, no myalgias, no early morning stiffness, no Raynaud's   Neuro: no focal neurological deficits, no seizures  Sleep: no snoring, no daytime somnolence   Psych: no depression, no anxiety, no suicidal ideation      Physical Exam:  VITALS:   BP (!) 130/100   Pulse 89   Ht 6' 1\" (1.854 m)   Wt 236 lb (107 kg)   SpO2 96%   BMI 31.14 kg/m²     PHYSICAL EXAMINATION:  General: alert, awake, and oriented to time, place, person, and situation. Not in acute distress   Skin:  no suspicious rashes, no jaundice  Head: normocephalic/atraumatic  Eyes: anicteric sclera, well-injected conjunctiva. Pupils are equally round and reactive to light. Extraocular movements are intact   Nose: no septal deviation evident  Sinuses: no sinus tenderness  Ears: external ears normal  Neck: supple, no cervical lymphadenopathy, thyroid symmetric and not enlarged, no bruits   Heart: regular rate and rhythm, regular S1/S2, no S3/S4, no audible murmurs, no audible friction rub  Lungs: clear to auscultation bilaterally, no audible crackles, no audible wheezes, no audible rhonchi    Abdomen: normal bowel sounds, soft abdomen, non-tender, no palpable masses  Extremities: no edema, warm, no cyanosis, no clubbing. Good capillary refill   MSK: no tenderness across spinous processes, full ROM in all 4 extremities. No joint swelling or tenderness   Peripheral vascular: 2+ pulses symmetric (radial)  Neuro: gait normal, CN II-XII intact, motor power 5/5 in all 4 extremities, sensation intact and symmetric    Labs   I have personally reviewed labs, and discussed pertinent findings with patient on this date 7/27/2021     Imaging   I have personally reviewed imaging, and discussed pertinent findings with patient on this date 7/27/2021     Other notes  I have personally reviewed other notes, and discussed pertinent findings with patient on this date 7/27/2021       Assessment/Plan:     1.  White coat syndrome without hypertension  Recommend patient log BP at home daily and FU in 1 month  Off Lisinopril for years  Discussed low salt    2. Mixed hyperlipidemia  ,  in 2018  Not on statin  Discussed heart healthy diet  - CBC Auto Differential; Future  - COMPREHENSIVE METABOLIC PANEL; Future  - Lipid, Fasting; Future    3. Vitamin D deficiency  Level 21.2 in 2018  Not on supplementation  - VITAMIN D 25 HYDROXY; Future    4. Thyroid disorder  - TSH with Reflex; Future    5. Colon cancer screening  - [facility] - Chelsea Real MD, Gastroenterology, [facility] discussed with patient and questions answered. Patient verbalizes understanding and agrees with plan. Discussed with patient the importance of continuity of care. I encouraged patient to schedule next appointment within 4 weeks with me. Patient prefers to be reached by Phone call at [phone] for future medical correspondence. Encouraged to activate [facility]. I reviewed and reconciled the medications this visit. I reviewed and updated the past medical, surgical, social, and family history during this visit. After visit summary provided.        Juanita Smith MD  Internal Medicine  7/27/2021   3:03 PM

## 2021-08-02 ENCOUNTER — TELEPHONE (OUTPATIENT)
Dept: INTERNAL MEDICINE CLINIC | Age: 46
End: 2021-08-02

## 2021-08-02 RX ORDER — LISINOPRIL 10 MG/1
10 TABLET ORAL DAILY
Qty: 30 TABLET | Refills: 0 | Status: SHIPPED | OUTPATIENT
Start: 2021-08-02 | End: 2021-08-25 | Stop reason: SDUPTHER

## 2021-08-02 NOTE — TELEPHONE ENCOUNTER
Will send Lisinopril 10mg to [facility] since he has been on it before (but he was on 20mg in the past however will start with 10mg)

## 2021-08-02 NOTE — TELEPHONE ENCOUNTER
130/91 bp has been running in the high 130's and 90's  Wants to know if should start medication, does not want to wait for appointment feels it is to far away and needs to be addressed

## 2021-08-18 ENCOUNTER — TELEPHONE (OUTPATIENT)
Dept: INTERNAL MEDICINE CLINIC | Age: 46
End: 2021-08-18

## 2021-08-18 NOTE — TELEPHONE ENCOUNTER
Called and talked to patient. He understood. He will have labs done at [address] [place] [address] and lab orders faxed to them. Informed patient we would not get results and he will have to have them fax them or hand carry.

## 2021-08-18 NOTE — TELEPHONE ENCOUNTER
Wife called that his BP is still high on Lisinopril. Patient was on Lisinopril 20mg in the past.     He is currently on 10mg. I recommend he take 2 tablets once daily of the 10mg, and will re-assess BP during upcoming visit in 1 week.

## 2021-08-19 ENCOUNTER — TELEPHONE (OUTPATIENT)
Dept: INTERNAL MEDICINE CLINIC | Age: 46
End: 2021-08-19

## 2021-08-19 NOTE — TELEPHONE ENCOUNTER
Faxed blood work orders to [facility] per patient's wife.       Confirmation of fax received

## 2021-08-25 RX ORDER — LISINOPRIL 10 MG/1
20 TABLET ORAL DAILY
Qty: 30 TABLET | Refills: 0 | Status: SHIPPED | OUTPATIENT
Start: 2021-08-25 | End: 2021-09-08

## 2021-08-25 NOTE — TELEPHONE ENCOUNTER
Patient's dosage was increased to 20 mg, he is almost out of medication  Needs new prescription for 20 mg  Sent to pharmacy

## 2021-09-08 ENCOUNTER — OFFICE VISIT (OUTPATIENT)
Dept: INTERNAL MEDICINE CLINIC | Age: 46
End: 2021-09-08
Payer: COMMERCIAL

## 2021-09-08 VITALS
DIASTOLIC BLOOD PRESSURE: 90 MMHG | OXYGEN SATURATION: 97 % | BODY MASS INDEX: 30.56 KG/M2 | HEIGHT: 73 IN | WEIGHT: 230.6 LBS | HEART RATE: 77 BPM | RESPIRATION RATE: 16 BRPM | SYSTOLIC BLOOD PRESSURE: 130 MMHG

## 2021-09-08 DIAGNOSIS — I10 ESSENTIAL HYPERTENSION: Primary | ICD-10-CM

## 2021-09-08 PROCEDURE — 99213 OFFICE O/P EST LOW 20 MIN: CPT | Performed by: INTERNAL MEDICINE

## 2021-09-08 RX ORDER — LISINOPRIL 40 MG/1
40 TABLET ORAL DAILY
Qty: 30 TABLET | Refills: 0 | Status: SHIPPED | OUTPATIENT
Start: 2021-09-08 | End: 2021-09-10 | Stop reason: SDUPTHER

## 2021-09-08 RX ORDER — LISINOPRIL 10 MG/1
20 TABLET ORAL DAILY
Qty: 30 TABLET | Refills: 0 | Status: CANCELLED | OUTPATIENT
Start: 2021-09-08

## 2021-09-08 NOTE — PROGRESS NOTES
cough, no wheezing, no hemoptysis  GI: no nausea, no vomiting, no diarrhea, no constipation, no abdominal pain, no hematochezia  : no dysuria, no frequency, no urgency, no hematuria, no frothy urine  MSK: no arthralgias, no myalgias, no early morning stiffness, no Raynaud's   Neuro: no focal neurological deficits, no seizures  Sleep: no snoring, no daytime somnolence   Psych: no depression, no anxiety, no suicidal ideation      Physical Exam:  VITALS:   BP (!) 130/90 (Site: Left Upper Arm, Position: Sitting, Cuff Size: Medium Adult)   Pulse 77   Resp 16   Ht 6' 1\" (1.854 m)   Wt 230 lb 9.6 oz (104.6 kg)   SpO2 97%   BMI 30.42 kg/m²     PHYSICAL EXAMINATION:  General: alert, awake, and oriented to time, place, person, and situation. Not in acute distress  Skin:  no suspicious rashes, no jaundice  Head: normocephalic/atraumatic  Eyes: anicteric sclera, well-injected conjunctiva. Pupils are equally round and reactive to light. Extraocular movements are intact   Nose: no septal deviation evident  Sinuses: no sinus tenderness  Ears: external ears normal  Neck: supple, no cervical lymphadenopathy, thyroid symmetric and not enlarged, no bruits   Heart: regular rate and rhythm, regular S1/S2, no S3/S4, no audible murmurs, no audible friction rub  Lungs: clear to auscultation bilaterally, no audible crackles, no audible wheezes, no audible rhonchi    Abdomen: normal bowel sounds, soft abdomen, non-tender, no palpable masses  Extremities: no edema, warm, no cyanosis, no clubbing. Good capillary refill   MSK: no tenderness across spinous processes, full ROM in all 4 extremities.  No joint swelling or tenderness   Peripheral vascular: 2+ pulses symmetric (radial)  Neuro: gait normal, CN II-XII intact, motor power 5/5 in all 4 extremities, sensation intact and symmetric    Labs   I have personally reviewed labs, and discussed pertinent findings with patient on this date 9/8/2021     Imaging   I have personally reviewed imaging, and discussed pertinent findings with patient on this date 9/8/2021     Other notes  I have personally reviewed other notes, and discussed pertinent findings with patient on this date 9/8/2021       Assessment/Plan:     1. Essential hypertension  Uncontrolled  Discussed low salt  Increase Lisinopril to 40mg QD   FU in 1 month  Labs ordered but not done yet. Encouraged to get done prior to next visit       Care discussed with patient and questions answered. Patient verbalizes understanding and agrees with plan. Discussed with patient the importance of continuity of care. I encouraged patient to schedule next appointment within 1 month with me. Patient prefers to be reached by Phone call at [phone] for future medical correspondence. Encouraged to activate [employer]. I reviewed and reconciled the medications this visit. I reviewed and updated the past medical, surgical, social, and family history during this visit. After visit summary provided.        Chu Raines MD  Internal Medicine  9/8/2021   4:34 PM

## 2021-09-10 ENCOUNTER — TELEPHONE (OUTPATIENT)
Dept: INTERNAL MEDICINE CLINIC | Age: 46
End: 2021-09-10

## 2021-09-10 DIAGNOSIS — I10 ESSENTIAL HYPERTENSION: ICD-10-CM

## 2021-09-10 RX ORDER — LISINOPRIL 40 MG/1
40 TABLET ORAL DAILY
Qty: 30 TABLET | Refills: 0 | Status: SHIPPED | OUTPATIENT
Start: 2021-09-10 | End: 2021-10-08 | Stop reason: SDUPTHER

## 2021-09-10 NOTE — TELEPHONE ENCOUNTER
Patient called and stated that he was having issues getting his scripts and would like to know if they can be resent to [facility] in [place]. Patient tried to call and have them transferred but the pharmacy said they couldn't do it.

## 2021-10-06 ENCOUNTER — TELEPHONE (OUTPATIENT)
Dept: ADMINISTRATIVE | Age: 46
End: 2021-10-06

## 2021-10-08 ENCOUNTER — OFFICE VISIT (OUTPATIENT)
Dept: INTERNAL MEDICINE CLINIC | Age: 46
End: 2021-10-08
Payer: COMMERCIAL

## 2021-10-08 VITALS
DIASTOLIC BLOOD PRESSURE: 80 MMHG | HEART RATE: 72 BPM | HEIGHT: 73 IN | WEIGHT: 230 LBS | RESPIRATION RATE: 18 BRPM | BODY MASS INDEX: 30.48 KG/M2 | OXYGEN SATURATION: 95 % | SYSTOLIC BLOOD PRESSURE: 118 MMHG

## 2021-10-08 DIAGNOSIS — E78.2 MIXED HYPERLIPIDEMIA: ICD-10-CM

## 2021-10-08 DIAGNOSIS — R73.03 PREDIABETES: ICD-10-CM

## 2021-10-08 DIAGNOSIS — I10 ESSENTIAL HYPERTENSION: Primary | ICD-10-CM

## 2021-10-08 PROCEDURE — 99214 OFFICE O/P EST MOD 30 MIN: CPT | Performed by: INTERNAL MEDICINE

## 2021-10-08 RX ORDER — LISINOPRIL 40 MG/1
40 TABLET ORAL DAILY
Qty: 90 TABLET | Refills: 1 | Status: SHIPPED | OUTPATIENT
Start: 2021-10-08 | End: 2022-02-08 | Stop reason: SDUPTHER

## 2021-10-08 ASSESSMENT — PATIENT HEALTH QUESTIONNAIRE - PHQ9
SUM OF ALL RESPONSES TO PHQ9 QUESTIONS 1 & 2: 0
SUM OF ALL RESPONSES TO PHQ QUESTIONS 1-9: 0
2. FEELING DOWN, DEPRESSED OR HOPELESS: 0
SUM OF ALL RESPONSES TO PHQ QUESTIONS 1-9: 0
1. LITTLE INTEREST OR PLEASURE IN DOING THINGS: 0
SUM OF ALL RESPONSES TO PHQ QUESTIONS 1-9: 0

## 2021-10-08 NOTE — PROGRESS NOTES
10/8/21    Inez Alcala  1975    Chief Complaint   Patient presents with    1 Month Follow-Up       History of Present Illness:  Inez Alcala is a 40 y.o. pleasant gentleman presenting today with a chief complaint of HTN, HL, prediabetes. He has a past medical history significant for:  HTN, on Lisinopril 40mg QD   HL (,  on 10/6/2021), on Omega-3   Prediabetes (HbA1C 6.1% on 10/6/2021)  Obesity (BMI 30)   COVID-19 (02/2021)  S/p tonsillectomy  S/p vasectomy   Former smoker (quit 2018)      # Patient has h/o high BP readings on chart review. Used to be on Lisinopril in 2014. Reports at home his BP is not high. Usually higher in physicians' offices. Re-introduced Lisinopril. Watching salt intake. BP today 118/80. # Patient with HL. Not on statin. Labs not done since 2018. # Not UTD on colon cancer screening. Scheduled for colonoscopy 12/3/2021. # UTD on COVID-19 vaccination (Napoleon Miguel).        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[employer]       Health maintenance:   Health Maintenance Due   Topic Date Due    Hepatitis C screen  Never done    COVID-19 Vaccine (1) Never done    HIV screen  Never done    DTaP/Tdap/Td vaccine (1 - Tdap) Never done    Colon Cancer Screen FIT/FOBT  02/04/2021    Potassium monitoring  06/29/2021    Creatinine monitoring  06/29/2021    Flu vaccine (1) Never done         Review of Systems:  Constitutional: no fevers, no chills, no night sweats, no weight loss, no weight gain, no fatigue   Pain assessment: no pain  Head: no headaches  Ears: no hearing loss, no tinnitus, no vertigo  Eyes: no blurry vision, no diplopia, no dryness, no itchiness, R eye twitching horizontally intermittently  Mouth: no oral ulcers, no dry mouth, no sore throat  Nose: no nasal congestion, no epistaxis  Cardiac: no chest pain, no palpitations, no leg swelling, no orthopnea, no PND, no syncope  Pulmonary: no dyspnea, no cough, no wheezing, no hemoptysis  GI: no nausea, no vomiting, no diarrhea, no constipation, no abdominal pain, no hematochezia  : no dysuria, no frequency, no urgency, no hematuria, no frothy urine  MSK: no arthralgias, no myalgias, no early morning stiffness, no Raynaud's   Neuro: no focal neurological deficits, no seizures  Sleep: no snoring, no daytime somnolence   Psych: no depression, no anxiety, no suicidal ideation      Physical Exam:  VITALS:   /80 (Site: Right Upper Arm, Position: Sitting, Cuff Size: Large Adult)   Pulse 72   Resp 18   Ht 6' 1\" (1.854 m)   Wt 230 lb (104.3 kg)   SpO2 95%   BMI 30.34 kg/m²     PHYSICAL EXAMINATION:  General: alert, awake, and oriented to time, place, person, and situation. Not in acute distress   Skin:  no suspicious rashes, no jaundice  Head: normocephalic/atraumatic  Eyes: anicteric sclera, well-injected conjunctiva. Pupils are equally round and reactive to light. Extraocular movements are intact   Nose: no septal deviation evident  Sinuses: no sinus tenderness  Ears: external ears normal  Neck: supple, no cervical lymphadenopathy, thyroid symmetric and not enlarged, no bruits   Heart: regular rate and rhythm, regular S1/S2, no S3/S4, no audible murmurs, no audible friction rub  Lungs: clear to auscultation bilaterally, no audible crackles, no audible wheezes, no audible rhonchi    Abdomen: normal bowel sounds, soft abdomen, non-tender, no palpable masses  Extremities: no edema, warm, no cyanosis, no clubbing. Good capillary refill   MSK: no tenderness across spinous processes, full ROM in all 4 extremities.  No joint swelling or tenderness   Peripheral vascular: 2+ pulses symmetric (radial)  Neuro: gait normal, CN II-XII intact, motor power 5/5 in all 4 extremities, sensation intact and symmetric    Labs   I have personally reviewed labs, and discussed pertinent findings with patient on this date 10/8/2021     Imaging   I have personally reviewed imaging, and discussed pertinent findings with patient on this date 10/8/2021     Other notes  I have personally reviewed other notes, and discussed pertinent findings with patient on this date 10/8/2021       Assessment/Plan:     1. Essential hypertension  Stable  Continue Lisinopril 40mg QD     2. Mixed hyperlipidemia  ,  in Oct 2021  Not on statin  Continue Omega-3   Discussed heart healthy diet    3. Prediabetes  A1C 6.1% in Oct 2021  Discussed dietary habit changes       Care discussed with patient and questions answered. Patient verbalizes understanding and agrees with plan. Discussed with patient the importance of continuity of care. I encouraged patient to schedule next appointment within 4 months with me. Patient prefers to be reached by Phone call at [phone] for future medical correspondence. Encouraged to activate [employer]. I reviewed and reconciled the medications this visit. I reviewed and updated the past medical, surgical, social, and family history during this visit. After visit summary provided.        Yo Stroud MD  Internal Medicine  10/8/2021   5:32 PM

## 2022-01-28 ENCOUNTER — TELEPHONE (OUTPATIENT)
Dept: SURGERY | Age: 47
End: 2022-01-28

## 2022-02-08 ENCOUNTER — OFFICE VISIT (OUTPATIENT)
Dept: INTERNAL MEDICINE CLINIC | Age: 47
End: 2022-02-08
Payer: COMMERCIAL

## 2022-02-08 VITALS
WEIGHT: 238 LBS | RESPIRATION RATE: 18 BRPM | HEART RATE: 82 BPM | BODY MASS INDEX: 31.54 KG/M2 | SYSTOLIC BLOOD PRESSURE: 122 MMHG | HEIGHT: 73 IN | OXYGEN SATURATION: 96 % | DIASTOLIC BLOOD PRESSURE: 88 MMHG

## 2022-02-08 DIAGNOSIS — R73.03 PREDIABETES: ICD-10-CM

## 2022-02-08 DIAGNOSIS — E78.2 MIXED HYPERLIPIDEMIA: ICD-10-CM

## 2022-02-08 DIAGNOSIS — I10 ESSENTIAL HYPERTENSION: Primary | ICD-10-CM

## 2022-02-08 PROCEDURE — 99214 OFFICE O/P EST MOD 30 MIN: CPT | Performed by: INTERNAL MEDICINE

## 2022-02-08 RX ORDER — LISINOPRIL 40 MG/1
40 TABLET ORAL DAILY
Qty: 90 TABLET | Refills: 1 | Status: SHIPPED | OUTPATIENT
Start: 2022-02-08 | End: 2022-08-05

## 2022-02-08 ASSESSMENT — PATIENT HEALTH QUESTIONNAIRE - PHQ9
SUM OF ALL RESPONSES TO PHQ9 QUESTIONS 1 & 2: 0
1. LITTLE INTEREST OR PLEASURE IN DOING THINGS: 0
SUM OF ALL RESPONSES TO PHQ QUESTIONS 1-9: 0
2. FEELING DOWN, DEPRESSED OR HOPELESS: 0
SUM OF ALL RESPONSES TO PHQ QUESTIONS 1-9: 0

## 2022-02-08 NOTE — PROGRESS NOTES
2/8/22    Monty Khan  1975    Chief Complaint   Patient presents with    Other     4 mo fu        History of Present Illness:  Monty Khan is a 56 y.o. pleasant gentleman presenting today with a chief complaint of HTN. He has a past medical history significant for:  HTN, on Lisinopril 40mg QD   HL (,  on 10/6/2021), on Omega-3   Prediabetes (HbA1C 6.1% on 10/6/2021), not on meds   Obesity (BMI 31)   COVID-19 (02/2021)  S/p tonsillectomy  S/p vasectomy   Former smoker (quit 2018)      # Patient has h/o high BP readings on chart review. Used to be on Lisinopril in 2014. Reports at home his BP is not high. Usually higher in physicians' offices.   Re-introduced Lisinopril. Watching salt intake.   BP today 122/88  # Patient with HL. Not on statin. # Colonoscopy Dec 2021 with 1 polyp. Due in 5 years. Will try to get hemorrhoidectomy through Surgeon (as Dr. Omid Grullon cannot remove them surgically). # UTD on COVID-19 vaccination (Napoleon Miguel).  Not due for booster yet.       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[employer]       Health maintenance:   Health Maintenance Due   Topic Date Due    Hepatitis C screen  Never done    COVID-19 Vaccine (1) Never done    HIV screen  Never done    DTaP/Tdap/Td vaccine (1 - Tdap) Never done    Potassium monitoring  06/29/2021    Creatinine monitoring  06/29/2021    Flu vaccine (1) Never done         Review of Systems:  Constitutional: no fevers, no chills, no night sweats, no weight loss, no weight gain, no fatigue   Pain assessment: no pain  Head: no headaches  Ears: no hearing loss, no tinnitus, no vertigo  Eyes: no blurry vision, no diplopia, no dryness, no itchiness  Mouth: no oral ulcers, no dry mouth, no sore throat  Nose: no nasal congestion, no epistaxis  Cardiac: no chest pain, no palpitations, no leg swelling, no orthopnea, no PND, no syncope  Pulmonary: no dyspnea, no cough, no wheezing, no hemoptysis  GI: no nausea, no vomiting, no diarrhea, no constipation, no abdominal pain, no hematochezia  : no dysuria, no frequency, no urgency, no hematuria, no frothy urine  MSK: no arthralgias, no myalgias, no early morning stiffness, no Raynaud's   Neuro: no focal neurological deficits, no seizures  Sleep: no snoring, no daytime somnolence   Psych: no depression, no anxiety, no suicidal ideation      Physical Exam:  VITALS:   /88 (Site: Left Upper Arm, Position: Sitting, Cuff Size: Large Adult)   Pulse 82   Resp 18   Ht 6' 1\" (1.854 m)   Wt 238 lb (108 kg)   SpO2 96%   BMI 31.40 kg/m²     PHYSICAL EXAMINATION:  General: alert, awake, and oriented to time, place, person, and situation. Not in acute distress   Skin:  no suspicious rashes, no jaundice  Head: normocephalic/atraumatic  Eyes: anicteric sclera, well-injected conjunctiva. Pupils are equally round and reactive to light. Extraocular movements are intact   Nose: no septal deviation evident  Sinuses: no sinus tenderness  Ears: external ears normal  Neck: supple, no cervical lymphadenopathy, thyroid symmetric and not enlarged, no bruits   Heart: regular rate and rhythm, regular S1/S2, no S3/S4, no audible murmurs, no audible friction rub  Lungs: clear to auscultation bilaterally, no audible crackles, no audible wheezes, no audible rhonchi    Abdomen: normal bowel sounds, soft abdomen, non-tender, no palpable masses  Extremities: no edema, warm, no cyanosis, no clubbing. Good capillary refill   MSK: no tenderness across spinous processes, full ROM in all 4 extremities.  No joint swelling or tenderness   Peripheral vascular: 2+ pulses symmetric (radial)  Neuro: gait normal, CN II-XII intact, motor power 5/5 in all 4 extremities, sensation intact and symmetric    Labs   I have personally reviewed labs, and discussed pertinent findings with patient on this date 2/8/2022     Imaging   I have personally reviewed imaging, and discussed pertinent findings with patient on this date 2/8/2022     Other notes  I have personally reviewed other notes, and discussed pertinent findings with patient on this date 2/8/2022       Assessment/Plan:     1. Essential hypertension  Stable  Continue Lisinopril 40mg QD  - CBC Auto Differential; Future  - COMPREHENSIVE METABOLIC PANEL; Future    2. Mixed hyperlipidemia  ,  in Oct 2021  Continue Omega-3  - Lipid, Fasting; Future    3. Prediabetes  A1C 6.1% in Oct 2021  Diet controlled   - HEMOGLOBIN A1C; Future      Care discussed with patient and questions answered. Patient verbalizes understanding and agrees with plan. Discussed with patient the importance of continuity of care. I encouraged patient to schedule next appointment within 6 months with me. Patient prefers to be reached by Phone call at [phone] for future medical correspondence. Encouraged to activate [employer]. I reviewed and reconciled the medications this visit. I reviewed and updated the past medical, surgical, social, and family history during this visit. After visit summary provided.        Alden Alfaro MD  Internal Medicine  2/8/2022   11:51 AM

## 2022-02-08 NOTE — PATIENT INSTRUCTIONS
Fasting for a blood test: taking the right steps before testing helps ensure your results will be accurate. Why do I need to fast before my blood test?  If your healthcare provider has told you to fast before a blood test, it means you should not eat or drink anything, except water, for several hours before your test. When you eat and drink normally, those foods and beverages are absorbed into your bloodstream. That could affect the results of certain types of blood tests. What types of blood tests require fasting? The most common tests that require fasting are:   Glucose tests, which measure your blood sugar.  Lipid tests, which measure cholesterol and triglycerides. You do not need to be fasting for HbA1C test.     How long do I have to fast before the test?  You usually need to fast for 8-12 hours before the test. Most tests that require fasting are scheduled for early in the morning. That way, most of your fasting time will be overnight. Can I drink anything besides water during a fast?  No. Juice, coffee, soda, and other beverages can get in your bloodstream and affect your results. In addition, you should not:   Chew gum    Smoke    Exercise  These activities can also affect your results. But you can drink water. It's actually encouraged that you drink 2 glasses of water before any blood test. It helps keep more fluid in your veins, which can make it easier to draw blood. If you are dehydrated, your blood draw experience may be unpleasant. Can I continue taking medicine during a fast?  Most of the time it's OK to take your usual medicines with water, unless otherwise specified by your healthcare provider. You may need to avoid certain medicines that you normally take with food.      What if I make a mistake and have something to eat or drink besides water during my fast?  Tell your healthcare provider before your test. Your test will most likely have to be re-scheduled for another time when you are able to complete your fast.    When can I eat and drink normally again? As soon as your test is over. You may want to bring a snack with you, so you can eat right away. Is there anything else I need to know about fasting before a blood test?  Be sure to talk to your healthcare provider if you have any questions or concerns about fasting. You should talk to your provider before taking any lab test. Most tests don't require fasting or other special preparations. For others, you may need to avoid certain foods, medicines, or activities.        [facility]  [phone]

## 2022-02-28 ENCOUNTER — OFFICE VISIT (OUTPATIENT)
Dept: SURGERY | Age: 47
End: 2022-02-28
Payer: COMMERCIAL

## 2022-02-28 VITALS
SYSTOLIC BLOOD PRESSURE: 130 MMHG | HEART RATE: 75 BPM | BODY MASS INDEX: 31.29 KG/M2 | HEIGHT: 73 IN | DIASTOLIC BLOOD PRESSURE: 60 MMHG | OXYGEN SATURATION: 96 % | WEIGHT: 236.1 LBS

## 2022-02-28 DIAGNOSIS — K64.2 GRADE III HEMORRHOIDS: Primary | ICD-10-CM

## 2022-02-28 PROCEDURE — 99204 OFFICE O/P NEW MOD 45 MIN: CPT | Performed by: SURGERY

## 2022-03-27 NOTE — PROGRESS NOTES
Chief Complaint   Patient presents with    New Patient     hemmorhoids--last scope        SUBJECTIVE:  HPI: Patient presents for follow up of hemorrhoids. Onset of symptoms was several years ago. Patient does report perianal mass. Patient describes symptoms as gradually worsening. Patient denies fever/chills. Patient has bld   drainage from the rectal area. Treatment to date has been OTC creams: No. Patient denies a personalhistory of colon cancer. Patient denies family hx of colorectal CA  Patient denies a personal history of IBD. Last c-scope was     I have reviewedthe patient's(pertinent information to this visit) medical history, family history(scanned in  the Media tab under \"patient questioner\"), social history and review of systems with the patient today in the office.        Past Surgical History:   Procedure Laterality Date    ARM SURGERY Right 2017    muscle tear repair    TONSILLECTOMY      VASECTOMY       Past Medical History:   Diagnosis Date    Hypertension     Mixed hyperlipidemia 2021     Family History   Problem Relation Age of Onset    Diabetes Mother     High Blood Pressure Mother      Social History     Socioeconomic History    Marital status:      Spouse name: Not on file    Number of children: Not on file    Years of education: Not on file    Highest education level: Not on file   Occupational History    Not on file   Tobacco Use    Smoking status: Former Smoker     Packs/day: 0.75     Types: Cigarettes     Start date:      Quit date: 2018     Years since quittin.1    Smokeless tobacco: Current User     Types: Snuff   Substance and Sexual Activity    Alcohol use: Yes     Comment: occasionally    Drug use: No    Sexual activity: Yes     Partners: Female   Other Topics Concern    Not on file   Social History Narrative    Not on file     Social Determinants of Health     Financial Resource Strain: Low Risk     Difficulty of Paying Living Expenses: Not hard at all   Food Insecurity: No Food Insecurity    Worried About Running Out of Food in the Last Year: Never true    Mari of Food in the Last Year: Never true   Transportation Needs: Unmet Transportation Needs    Lack of Transportation (Medical): Yes    Lack of Transportation (Non-Medical): Yes   Physical Activity:     Days of Exercise per Week: Not on file    Minutes of Exercise per Session: Not on file   Stress:     Feeling of Stress : Not on file   Social Connections:     Frequency of Communication with Friends and Family: Not on file    Frequency of Social Gatherings with Friends and Family: Not on file    Attends Oriental orthodox Services: Not on file    Active Member of [address] [place] [address] [employer] or Organizations: Not on file    Attends Club or Organization Meetings: Not on file    Marital Status: Not on file   Intimate Partner Violence:     Fear of Current or Ex-Partner: Not on file    Emotionally Abused: Not on file    Physically Abused: Not on file    Sexually Abused: Not on file   Housing Stability:     Unable to Pay for Housing in the Last Year: Not on file    Number of [place] in the Last Year: Not on file    Unstable Housing in the Last Year: Not on file       Current Outpatient Medications   Medication Sig Dispense Refill    lisinopril (PRINIVIL;ZESTRIL) 40 MG tablet Take 1 tablet by mouth daily 90 tablet 1     No current facility-administered medications for this visit. Allergies   Allergen Reactions    Percocet [Oxycodone-Acetaminophen] Hives       Review of Systems:         Review of Systems      OBJECTIVE:  Physical Exam:    /60   Pulse 75   Ht 6' 1\" (1.854 m)   Wt 236 lb 1.6 oz (107.1 kg)   SpO2 96%   BMI 31.15 kg/m²      Physical Exam      ASSESSMENT:  1. Grade III hemorrhoids          PLAN:  Treatment: will Rx rectal suppository for now. will re-evaluate in one month. Patient counseled on risks, benefits, and alternatives of treatment plan at length today. Patient states an understanding and willingness to proceed with plan. No orders of the defined types were placed in this encounter. No orders of the defined types were placed in this encounter. Follow Up:  No follow-ups on file.       Germaine Dorantes MD

## 2022-03-28 ENCOUNTER — TELEPHONE (OUTPATIENT)
Dept: SURGERY | Age: 47
End: 2022-03-28

## 2022-03-28 NOTE — TELEPHONE ENCOUNTER
Dorothy Castaneda did not  medication to be used, he would like it called into his pharmacy, so he may try it and reschedule his appt after the use.

## 2022-08-04 ENCOUNTER — TELEPHONE (OUTPATIENT)
Dept: INTERNAL MEDICINE CLINIC | Age: 47
End: 2022-08-04

## 2022-08-04 NOTE — TELEPHONE ENCOUNTER
Pts wife called and stated that the pt has been having a dry cough and blurred vision, pt stopped taking his lisinopril for about 2 days and these symptoms went away, pt restarted the medication this morning and the symptoms have returned. Pt would like to see what you recommend him do. Please advise. Thank you!

## 2022-08-05 RX ORDER — LOSARTAN POTASSIUM 100 MG/1
100 TABLET ORAL DAILY
Qty: 30 TABLET | Refills: 0 | Status: SHIPPED | OUTPATIENT
Start: 2022-08-05 | End: 2022-08-12

## 2022-08-05 NOTE — TELEPHONE ENCOUNTER
Will switch Lisinopril 40mg to Losartan 100mg. He has FU with me on 8/12 so will check BP then and decide if that alone is enough.

## 2022-08-11 NOTE — TELEPHONE ENCOUNTER
Called patient and he states after he stopped his medication he was checking bp and it was in the normal range. 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 I informed him if he ever just wants a bp check he can come in and ask for me and I will check it for him. Hes not sure if he will  new med yet but will keep monitoring bp.

## 2022-08-12 ENCOUNTER — OFFICE VISIT (OUTPATIENT)
Dept: INTERNAL MEDICINE CLINIC | Age: 47
End: 2022-08-12
Payer: COMMERCIAL

## 2022-08-12 VITALS
RESPIRATION RATE: 16 BRPM | SYSTOLIC BLOOD PRESSURE: 142 MMHG | WEIGHT: 235 LBS | HEART RATE: 77 BPM | OXYGEN SATURATION: 97 % | HEIGHT: 73 IN | BODY MASS INDEX: 31.14 KG/M2 | DIASTOLIC BLOOD PRESSURE: 82 MMHG

## 2022-08-12 DIAGNOSIS — I10 ESSENTIAL HYPERTENSION: Primary | ICD-10-CM

## 2022-08-12 DIAGNOSIS — R73.03 PREDIABETES: ICD-10-CM

## 2022-08-12 DIAGNOSIS — E78.2 MIXED HYPERLIPIDEMIA: ICD-10-CM

## 2022-08-12 PROCEDURE — 99214 OFFICE O/P EST MOD 30 MIN: CPT | Performed by: INTERNAL MEDICINE

## 2022-08-12 SDOH — ECONOMIC STABILITY: FOOD INSECURITY: WITHIN THE PAST 12 MONTHS, YOU WORRIED THAT YOUR FOOD WOULD RUN OUT BEFORE YOU GOT MONEY TO BUY MORE.: NEVER TRUE

## 2022-08-12 SDOH — ECONOMIC STABILITY: FOOD INSECURITY: WITHIN THE PAST 12 MONTHS, THE FOOD YOU BOUGHT JUST DIDN'T LAST AND YOU DIDN'T HAVE MONEY TO GET MORE.: NEVER TRUE

## 2022-08-12 ASSESSMENT — SOCIAL DETERMINANTS OF HEALTH (SDOH): HOW HARD IS IT FOR YOU TO PAY FOR THE VERY BASICS LIKE FOOD, HOUSING, MEDICAL CARE, AND HEATING?: NOT VERY HARD

## 2022-08-12 NOTE — PATIENT INSTRUCTIONS
Fasting for a blood test: taking the right steps before testing helps ensure your results will be accurate. Why do I need to fast before my blood test?  If your healthcare provider has told you to fast before a blood test, it means you should not eat or drink anything, except water, for several hours before your test. When you eat and drink normally, those foods and beverages are absorbed into your bloodstream. That could affect the results of certain types of blood tests. What types of blood tests require fasting? The most common tests that require fasting are:  Glucose tests, which measure your blood sugar. Lipid tests, which measure cholesterol and triglycerides. You do not need to be fasting for HbA1C test.     How long do I have to fast before the test?  You usually need to fast for 8-12 hours before the test. Most tests that require fasting are scheduled for early in the morning. That way, most of your fasting time will be overnight. Can I drink anything besides water during a fast?  No. Juice, coffee, soda, and other beverages can get in your bloodstream and affect your results. In addition, you should not:  Chew gum   Smoke   Exercise  These activities can also affect your results. But you can drink water. It's actually encouraged that you drink 2 glasses of water before any blood test. It helps keep more fluid in your veins, which can make it easier to draw blood. If you are dehydrated, your blood draw experience may be unpleasant. Can I continue taking medicine during a fast?  Most of the time it's OK to take your usual medicines with water, unless otherwise specified by your healthcare provider. You may need to avoid certain medicines that you normally take with food.      What if I make a mistake and have something to eat or drink besides water during my fast?  Tell your healthcare provider before your test. Your test will most likely have to be re-scheduled for another time when you are able to complete your fast.    When can I eat and drink normally again? As soon as your test is over. You may want to bring a snack with you, so you can eat right away. Is there anything else I need to know about fasting before a blood test?  Be sure to talk to your healthcare provider if you have any questions or concerns about fasting. You should talk to your provider before taking any lab test. Most tests don't require fasting or other special preparations. For others, you may need to avoid certain foods, medicines, or activities.        [facility]  [phone]

## 2022-08-12 NOTE — PROGRESS NOTES
8/12/22    Michele Mark  1975    Chief Complaint   Patient presents with    6 Month Follow-Up       History of Present Illness:  Michele Mark is a 56 y.o. pleasant gentleman presenting today with a chief complaint of HTN. He has a past medical history significant for:  HTN, off Lisinopril  ACEi-induced cough   HL (,  on 10/6/2021), on Omega-3   Prediabetes (HbA1C 6.1% on 10/6/2021), not on meds   Obesity (BMI 31)  COVID-19 (02/2021)  S/p tonsillectomy  S/p vasectomy   Former smoker (quit 2018)      # Patient has h/o high BP readings on chart review. Used to be on Lisinopril in 2014. Reports at home his BP is not high. Usually higher in physicians' offices. Re-introduced Lisinopril. Patient developed cough and hypotensive symptoms, so he stopped it. I switched to Losartan based on him telling us that he was having cough but did not inform us at the time (tel enc) of hypotensive symptoms. Hence, patient did not start Losartan. Watching salt intake. BP today 142/82. # Patient with HL. Not on statin. # Colonoscopy Dec 2021 with 1 polyp. Due in 5 years. Will try to get hemorrhoidectomy through Surgeon (as Dr. Meli Jay cannot remove them surgically). # UTD on COVID-19 vaccination ([employer]). Not due for booster yet.        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[employer]       Health maintenance:   Health Maintenance Due   Topic Date Due    COVID-19 Vaccine (1) Never done    HIV screen  Never done    Hepatitis C screen  Never done    DTaP/Tdap/Td vaccine (1 - Tdap) Never done         Review of Systems:  Constitutional: no fevers, no chills, no night sweats, no weight loss, no weight gain, no fatigue   Pain assessment: no pain  Head: no headaches  Ears: no hearing loss, no tinnitus, no vertigo  Eyes: no blurry vision, no diplopia, no dryness, no itchiness  Mouth: no oral ulcers, no dry mouth, no sore throat  Nose: no nasal congestion, no epistaxis  Cardiac: no chest pain, no palpitations, no leg swelling, no orthopnea, no PND, no syncope  Pulmonary: no dyspnea, no cough, no wheezing, no hemoptysis  GI: no nausea, no vomiting, no diarrhea, no constipation, no abdominal pain, no hematochezia  : no dysuria, no frequency, no urgency, no hematuria, no frothy urine  MSK: no arthralgias, no myalgias, no early morning stiffness, no Raynaud's   Neuro: no focal neurological deficits, no seizures  Sleep: no snoring, no daytime somnolence   Psych: no depression, no anxiety, no suicidal ideation      Physical Exam:  VITALS:   BP (!) 142/82 (Site: Left Upper Arm, Position: Sitting, Cuff Size: Large Adult)   Pulse 77   Resp 16   Ht 6' 1\" (1.854 m)   Wt 235 lb (106.6 kg)   SpO2 97%   BMI 31.00 kg/m²     PHYSICAL EXAMINATION:  General: alert, awake, and oriented to time, place, person, and situation. Not in acute distress   Skin:  no suspicious rashes, no jaundice  Head: normocephalic/atraumatic  Eyes: anicteric sclera, well-injected conjunctiva. Pupils are equally round and reactive to light. Extraocular movements are intact   Nose: no septal deviation evident  Sinuses: no sinus tenderness  Ears: external ears normal  Neck: supple, no cervical lymphadenopathy, thyroid symmetric and not enlarged, no bruits   Heart: regular rate and rhythm, regular S1/S2, no S3/S4, no audible murmurs, no audible friction rub  Lungs: clear to auscultation bilaterally, no audible crackles, no audible wheezes, no audible rhonchi    Abdomen: normal bowel sounds, soft abdomen, non-tender, no palpable masses  Extremities: no edema, warm, no cyanosis, no clubbing. Good capillary refill   MSK: no tenderness across spinous processes, full ROM in all 4 extremities.  No joint swelling or tenderness   Peripheral vascular: 2+ pulses symmetric (radial)  Neuro: gait normal, CN II-XII intact, motor power 5/5 in all 4 extremities, sensation intact and symmetric    Labs   I have personally reviewed labs, and discussed pertinent findings with patient on this date 8/12/2022     Imaging   I have personally reviewed imaging, and discussed pertinent findings with patient on this date 8/12/2022     Other notes  I have personally reviewed other notes, and discussed pertinent findings with patient on this date 8/12/2022       Assessment/Plan:     1. Essential hypertension  BP too low on Lisinopril 40mg QD, and also had ACEi-induced cough  Patient does not want to start Losartan at the moment  Opts for aggressive lifestyle modifications. Close FU and BP log at home   - CBC with Auto Differential; Future  - Comprehensive Metabolic Panel; Future    2. Mixed hyperlipidemia  ,  Oct 2021  Not on statin  Continue Omega-3  - CBC with Auto Differential; Future  - Comprehensive Metabolic Panel; Future  - Lipid, Fasting; Future    3. Prediabetes  A1C 6.1% Oct 2021  Not on meds  - CBC with Auto Differential; Future  - Comprehensive Metabolic Panel; Future  - Hemoglobin A1C; Future      Care discussed with patient and questions answered. Patient verbalizes understanding and agrees with plan. Discussed with patient the importance of continuity of care. I encouraged patient to schedule next appointment within 6 weeks with me. Patient prefers to be reached by Phone call at [phone] for future medical correspondence. Encouraged to activate [employer]. I reviewed and reconciled the medications this visit. I reviewed and updated the past medical, surgical, social, and family history during this visit. After visit summary provided.        Simeon Collins MD  Internal Medicine  8/12/2022   4:22 PM

## 2022-09-21 ENCOUNTER — TELEPHONE (OUTPATIENT)
Dept: INTERNAL MEDICINE CLINIC | Age: 47
End: 2022-09-21

## 2022-09-21 NOTE — TELEPHONE ENCOUNTER
Called patient to reschedule and he did not want to reschedule. Stated he took Friday off work and did not want to schedule.

## 2022-09-22 ENCOUNTER — TELEMEDICINE (OUTPATIENT)
Dept: INTERNAL MEDICINE CLINIC | Age: 47
End: 2022-09-22
Payer: COMMERCIAL

## 2022-09-22 DIAGNOSIS — E78.2 MIXED HYPERLIPIDEMIA: ICD-10-CM

## 2022-09-22 DIAGNOSIS — I10 ESSENTIAL HYPERTENSION: ICD-10-CM

## 2022-09-22 DIAGNOSIS — G43.119 INTRACTABLE MIGRAINE WITH AURA WITHOUT STATUS MIGRAINOSUS: Primary | ICD-10-CM

## 2022-09-22 LAB
A/G RATIO: 2 (ref 1.2–2.2)
ALBUMIN SERPL-MCNC: 4.6 G/DL (ref 4–5)
ALP BLD-CCNC: 67 IU/L (ref 44–121)
ALT SERPL-CCNC: 19 IU/L (ref 0–44)
AMBIGUOUS ABBREVIATION: NORMAL
AMBIGUOUS ABBREVIATION: NORMAL
AST SERPL-CCNC: 17 IU/L (ref 0–40)
BASOPHILS ABSOLUTE: 0.1 X10E3/UL (ref 0–0.2)
BASOPHILS RELATIVE PERCENT: 1 %
BILIRUB SERPL-MCNC: 0.4 MG/DL (ref 0–1.2)
BUN / CREAT RATIO: 11 (ref 9–20)
BUN BLDV-MCNC: 12 MG/DL (ref 6–24)
CALCIUM SERPL-MCNC: 9.7 MG/DL (ref 8.7–10.2)
CHLORIDE BLD-SCNC: 102 MMOL/L (ref 96–106)
CHOLESTEROL, TOTAL: 218 MG/DL (ref 100–199)
CO2: 25 MMOL/L (ref 20–29)
COMMENT: ABNORMAL
CREAT SERPL-MCNC: 1.14 MG/DL (ref 0.76–1.27)
EOSINOPHILS ABSOLUTE: 0.2 X10E3/UL (ref 0–0.4)
EOSINOPHILS RELATIVE PERCENT: 4 %
ERYTHROCYTES, NUCLEATED/100 LEU: NORMAL
ESTIMATED GLOMERULAR FILTRATION RATE CREATININE EQUATION: 80 ML/MIN/1.73
GLOBULIN: 2.3 G/DL (ref 1.5–4.5)
GLUCOSE BLD-MCNC: 111 MG/DL (ref 65–99)
HBA1C MFR BLD: 5.5 % (ref 4.8–5.6)
HCT VFR BLD CALC: 48.6 % (ref 37.5–51)
HDLC SERPL-MCNC: 52 MG/DL
HEMOGLOBIN: 16 G/DL (ref 13–17.7)
IMMATURE CELLS ABSOLUTE COUNT: NORMAL
IMMATURE GRANS (ABS): 0 X10E3/UL (ref 0–0.1)
IMMATURE GRANULOCYTES: 0 %
LDL CHOLESTEROL CALCULATED: 123 MG/DL (ref 0–99)
LYMPHOCYTES ABSOLUTE: 1.4 X10E3/UL (ref 0.7–3.1)
LYMPHOCYTES RELATIVE PERCENT: 23 %
MCH RBC QN AUTO: 28 PG (ref 26.6–33)
MCHC RBC AUTO-ENTMCNC: 32.9 G/DL (ref 31.5–35.7)
MCV RBC AUTO: 85 FL (ref 79–97)
MONOCYTES ABSOLUTE: 0.4 X10E3/UL (ref 0.1–0.9)
MONOCYTES RELATIVE PERCENT: 7 %
MORPHOLOGY: NORMAL
NEUTROPHILS ABSOLUTE: 3.8 X10E3/UL (ref 1.4–7)
PDW BLD-RTO: 13.1 % (ref 11.6–15.4)
PLATELET # BLD: 290 X10E3/UL (ref 150–450)
POTASSIUM SERPL-SCNC: 4.5 MMOL/L (ref 3.5–5.2)
RBC # BLD: 5.71 X10E6/UL (ref 4.14–5.8)
SEGMENTED NEUTROPHILS RELATIVE PERCENT: 65 %
SODIUM BLD-SCNC: 142 MMOL/L (ref 134–144)
TOTAL PROTEIN: 6.9 G/DL (ref 6–8.5)
TRIGL SERPL-MCNC: 243 MG/DL (ref 0–149)
VLDLC SERPL CALC-MCNC: 43 MG/DL (ref 5–40)
WBC # BLD: 5.9 X10E3/UL (ref 3.4–10.8)

## 2022-09-22 PROCEDURE — 99214 OFFICE O/P EST MOD 30 MIN: CPT | Performed by: INTERNAL MEDICINE

## 2022-09-22 RX ORDER — DIPHENHYDRAMINE HCL 25 MG
50 CAPSULE ORAL ONCE
Qty: 2 CAPSULE | Refills: 1 | Status: SHIPPED | OUTPATIENT
Start: 2022-09-22 | End: 2022-09-22

## 2022-09-22 RX ORDER — IBUPROFEN 800 MG/1
800 TABLET ORAL ONCE
Qty: 1 TABLET | Refills: 1 | Status: SHIPPED | OUTPATIENT
Start: 2022-09-22 | End: 2022-10-12

## 2022-09-22 RX ORDER — LOSARTAN POTASSIUM 25 MG/1
25 TABLET ORAL DAILY
Qty: 30 TABLET | Refills: 0 | Status: SHIPPED | OUTPATIENT
Start: 2022-09-22 | End: 2022-10-12

## 2022-09-22 RX ORDER — PROCHLORPERAZINE MALEATE 10 MG
10 TABLET ORAL ONCE
Qty: 1 TABLET | Refills: 1 | Status: SHIPPED | OUTPATIENT
Start: 2022-09-22 | End: 2022-10-12

## 2022-09-22 NOTE — PROGRESS NOTES
TELEHEALTH EVALUATION -- Audio/Visual (During WFGVW-96 public health emergency)      Lupe Halsted  1975    Patient location: Patient Bradley Coelho is a 56 y.o. pleasant gentleman evaluated via video on 9/22/22       Consent:  He and/or health care decision maker is aware that that he may receive a bill for this virtual visit service, depending on his insurance coverage, and has provided verbal consent to proceed: Yes      History of Present Illness:  Lupe Halsted is a 56 y.o. pleasant gentleman who has requested an audio/video evaluation for the following concern(s): HTN, migraine, HL. He has a past medical history significant for:  HTN, off Lisinopril  ACEi-induced cough   HL (,  on 9/21/2022) ASCVD 3%, on Omega-3   Prediabetes (HbA1C 5.5% on 9/21/2022), not on meds   Obesity (BMI 31)  Migraine headaches   COVID-19 (02/2021)  S/p tonsillectomy  S/p vasectomy   Former smoker (quit 2018)      # Patient has h/o high BP readings on chart review. Used to be on Lisinopril in 2014. Reports BP is usually higher in physicians' offices. Re-introduced Lisinopril. Patient developed cough and hypotensive symptoms, so he stopped it. I switched to Losartan based on him telling us that he was having cough but did not inform us at the time (tel enc) of hypotensive symptoms. Hence, patient did not start Losartan. Watching salt intake. BP last OV was 142/82. Reports average at home has been lately 140/90. # Patient with HL. Not on statin. # Colonoscopy Dec 2021 with 1 polyp. Due in 5 years. Will try to get hemorrhoidectomy through Surgeon (as Dr. Katty Stone cannot remove them surgically). # Pfizer COVID-19 vaccination.       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 Intractable migraine with aura without status migrainosus  Migraine cocktail  Discussed side effects  - diphenhydrAMINE (BENADRYL ALLERGY) 25 MG capsule; Take 2 capsules by mouth once for 1 dose  Dispense: 2 capsule; Refill: 1  - prochlorperazine (COMPAZINE) 10 MG tablet; Take 1 tablet by mouth once for 1 dose  Dispense: 1 tablet; Refill: 1  - ibuprofen (ADVIL;MOTRIN) 800 MG tablet; Take 1 tablet by mouth once for 1 dose  Dispense: 1 tablet; Refill: 1    2. Essential hypertension  Start Losartan 25mg QD  Continue BP log and FU in 3 weeks    3. Mixed hyperlipidemia  ,  Sept 2022  ASCVD 3%  Re-introduce Omega-3 per patient preference  Heart healthy diet encouraged       Care discussed with patient and questions answered. Patient verbalizes understanding and agrees with plan. Discussed with patient the importance of continuity of care. I encouraged patient to schedule next appointment within 3 weeks with me. I reviewed and reconciled the medications this visit. I reviewed and updated the past medical, surgical, social, and family history during this visit. Pursuant to the emergency declaration under the [facility] waiver authority and the [employer] Act, this Virtual Visit was conducted, with patient's consent, to reduce the patient's risk of exposure to COVID-19 and provide continuity of care for an established patient. Services were provided through a video synchronous discussion virtually to substitute for in-person clinic visit.       Andrew Higuera MD  Internal Medicine  9/22/2022   10:46 AM

## 2022-09-30 ENCOUNTER — TELEPHONE (OUTPATIENT)
Dept: INTERNAL MEDICINE CLINIC | Age: 47
End: 2022-09-30

## 2022-09-30 NOTE — TELEPHONE ENCOUNTER
Patient informed of provider's instructions regarding Losartan and asked to schedule an appointment for the week of Oct 10 due to needing to give his work notice to take time off.  Appointment scheduled for 10/12/22 at 2 PM

## 2022-09-30 NOTE — TELEPHONE ENCOUNTER
He is on low dose Losartan. Needs higher dose.    Patient can take 2 pills Losartan 25mg (for a total of 50mg once daily)  Check BP and make FU appt for 1 week

## 2022-09-30 NOTE — TELEPHONE ENCOUNTER
Pt's wife called stating pt's bp's are extreamly high. They dont belive the new bp med is working. Pt went to eye doctor and he has something going on with eyes. The eye dictor did not think eye problems was because of the lisnopril. Pt's wife want to know if pt could go back on the lisnopril due to it working.    Last BP was 139/98

## 2022-10-12 ENCOUNTER — OFFICE VISIT (OUTPATIENT)
Dept: INTERNAL MEDICINE CLINIC | Age: 47
End: 2022-10-12
Payer: COMMERCIAL

## 2022-10-12 VITALS
HEIGHT: 73 IN | SYSTOLIC BLOOD PRESSURE: 128 MMHG | WEIGHT: 236 LBS | DIASTOLIC BLOOD PRESSURE: 82 MMHG | BODY MASS INDEX: 31.28 KG/M2 | RESPIRATION RATE: 18 BRPM

## 2022-10-12 DIAGNOSIS — G43.109 MIGRAINE WITH AURA AND WITHOUT STATUS MIGRAINOSUS, NOT INTRACTABLE: ICD-10-CM

## 2022-10-12 DIAGNOSIS — I10 ESSENTIAL HYPERTENSION: ICD-10-CM

## 2022-10-12 DIAGNOSIS — H49.12 LEFT-SIDED FOURTH CRANIAL NERVE PALSY: Primary | ICD-10-CM

## 2022-10-12 PROCEDURE — 99214 OFFICE O/P EST MOD 30 MIN: CPT | Performed by: INTERNAL MEDICINE

## 2022-10-12 RX ORDER — LOSARTAN POTASSIUM 100 MG/1
100 TABLET ORAL DAILY
Qty: 90 TABLET | Refills: 0 | Status: SHIPPED | OUTPATIENT
Start: 2022-10-12

## 2022-10-12 RX ORDER — SUMATRIPTAN 50 MG/1
TABLET, FILM COATED ORAL
Qty: 27 TABLET | Refills: 1 | Status: SHIPPED | OUTPATIENT
Start: 2022-10-12

## 2022-10-12 NOTE — PROGRESS NOTES
Patient was seen by eye specialist.  He states he has 4th nerve palsy in his eye causing double vision. Patient took a 100mg losarten today because he didn't have refills on the 25 and had a old script that pharmacy gave him.

## 2022-10-12 NOTE — PROGRESS NOTES
10/12/22    Carlene Brown  1975    Chief Complaint   Patient presents with    Hypertension       History of Present Illness:  Carlene Brown is a 56 y.o. pleasant gentleman presenting today with a chief complaint of HTN, isolated L CN IV palsy. He has a past medical history significant for:  HTN, on Losartan 100mg QD   ACEi-induced cough   HL (,  on 9/21/2022) ASCVD 3%, on Omega-3   Prediabetes (HbA1C 5.5% on 9/21/2022), not on meds   Migraine headaches   Obesity (BMI 31)  COVID-19 (02/2021)  S/p tonsillectomy  S/p vasectomy   Former smoker (quit 2018)      # Patient is seeing Ophtho for L CN IV palsy. Sister with MS. # Patient has h/o high BP readings on chart review. Used to be on Lisinopril in 2014. Reports BP is usually higher in physicians' offices. Re-introduced Lisinopril. Patient developed cough and hypotensive symptoms, so he stopped it. I switched to Losartan based on him telling us that he was having cough but did not inform us at the time (tel enc) of hypotensive symptoms. Hence, patient did not start Losartan. Watching salt intake. Started on Losartan for continued high BP. BP today 128/82. # Patient with HL. Not on statin. # Colonoscopy Dec 2021 with 1 polyp. Due in 5 years. Will try to get hemorrhoidectomy through Surgeon (as Dr. eYn Vides cannot remove them surgically). # Pfizer COVID-19 vaccination.        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[employer]       Health maintenance:   Health Maintenance Due   Topic Date Due    COVID-19 Vaccine (1) Never done    HIV screen  Never done    Hepatitis C screen  Never done    DTaP/Tdap/Td vaccine (1 - Tdap) Never done    Flu vaccine (1) Never done         Review of Systems:  Constitutional: no fevers, no chills   Head: migraine headaches  Ears: no hearing loss, no tinnitus, no vertigo  Eyes: no blurry vision, no diplopia, no dryness, no itchiness  Mouth: no oral ulcers, no dry mouth, no sore throat  Nose: no nasal congestion, no epistaxis  Cardiac: no chest pain, no palpitations, no leg swelling, no orthopnea, no PND, no syncope  Pulmonary: no dyspnea, no cough, no wheezing, no hemoptysis  GI: no nausea, no vomiting, no diarrhea, no constipation, no abdominal pain, no hematochezia  : no dysuria, no frequency, no urgency, no hematuria, no frothy urine  MSK: no arthralgias, no myalgias, no early morning stiffness, no Raynaud's   Neuro: no focal neurological deficits, no seizures  Sleep: no snoring, no daytime somnolence   Psych: no depression, no anxiety, no suicidal ideation      Physical Exam:  VITALS:   /82 (Site: Left Upper Arm, Position: Sitting, Cuff Size: Large Adult)   Resp 18   Ht 6' 1\" (1.854 m)   Wt 236 lb (107 kg)   BMI 31.14 kg/m²     PHYSICAL EXAMINATION:  General: alert, awake, and oriented to time, place, person, and situation. Not in acute distress   Skin:  no suspicious rashes, no jaundice  Head: normocephalic/atraumatic  Eyes: anicteric sclera, well-injected conjunctiva. Pupils are equally round and reactive to light. Extraocular movements are intact   Nose: no septal deviation evident  Sinuses: no sinus tenderness  Ears: external ears normal  Neck: supple, no cervical lymphadenopathy, thyroid symmetric and not enlarged, no bruits   Heart: regular rate and rhythm, regular S1/S2, no S3/S4, no audible murmurs, no audible friction rub  Lungs: clear to auscultation bilaterally, no audible crackles, no audible wheezes, no audible rhonchi    Abdomen: normal bowel sounds, soft abdomen, non-tender, no palpable masses  Extremities: no edema, warm, no cyanosis, no clubbing. Good capillary refill   MSK: no tenderness across spinous processes, full ROM in all 4 extremities.  No joint swelling or tenderness   Peripheral vascular: 2+ pulses symmetric (radial)  Neuro: gait normal, L CN IV palsy, motor power 5/5 in all 4 extremities, sensation intact and symmetric    Labs   I have personally reviewed labs, and discussed pertinent findings with patient on this date 10/12/2022     Imaging   I have personally reviewed imaging, and discussed pertinent findings with patient on this date 10/12/2022     Other notes  I have personally reviewed other notes, and discussed pertinent findings with patient on this date 10/12/2022       Assessment/Plan:     1. Left-sided fourth cranial nerve palsy  - MRI BRAIN W CONTRAST; Future    2. Essential hypertension  Stable  Continue Losartan 100mg QD     3. Migraine with aura and without status migrainosus, not intractable  Discussed side effects  - SUMAtriptan (IMITREX) 50 MG tablet; Take 1 pill once daily as needed for migraines. Can take another pill after 2 hours if needed. Do not exceed 2 pills per 24 hours  Dispense: 27 tablet; Refill: 1      Care discussed with patient and questions answered. Patient verbalizes understanding and agrees with plan. Discussed with patient the importance of continuity of care. I encouraged patient to schedule next appointment within 6 weeks with me. Patient prefers to be reached by Phone call at [phone] for future medical correspondence. Encouraged to activate [employer]. I reviewed and reconciled the medications this visit. I reviewed and updated the past medical, surgical, social, and family history during this visit. After visit summary provided.        Galo Link MD  Internal Medicine  10/12/2022   2:34 PM

## 2022-10-26 ENCOUNTER — TELEMEDICINE (OUTPATIENT)
Dept: INTERNAL MEDICINE CLINIC | Age: 47
End: 2022-10-26
Payer: COMMERCIAL

## 2022-10-26 ENCOUNTER — TELEPHONE (OUTPATIENT)
Dept: INTERNAL MEDICINE CLINIC | Age: 47
End: 2022-10-26

## 2022-10-26 DIAGNOSIS — G93.89 ENCEPHALOMALACIA: Primary | ICD-10-CM

## 2022-10-26 DIAGNOSIS — I10 ESSENTIAL HYPERTENSION: ICD-10-CM

## 2022-10-26 DIAGNOSIS — H49.12 LEFT-SIDED FOURTH CRANIAL NERVE PALSY: ICD-10-CM

## 2022-10-26 PROCEDURE — 99442 PR PHYS/QHP TELEPHONE EVALUATION 11-20 MIN: CPT | Performed by: INTERNAL MEDICINE

## 2022-10-26 RX ORDER — HYDROCHLOROTHIAZIDE 12.5 MG/1
12.5 CAPSULE, GELATIN COATED ORAL EVERY MORNING
Qty: 30 CAPSULE | Refills: 0 | Status: SHIPPED | OUTPATIENT
Start: 2022-10-26 | End: 2022-11-23

## 2022-10-26 NOTE — TELEPHONE ENCOUNTER
Please scan MRIb in chart  Please schedule VV. I need to discuss why wife would like Losartan switched to Lisinopril? I can then discuss results of MRIb with them both.
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
## 2022-10-26 NOTE — TELEPHONE ENCOUNTER
Pts wife called and states she wants the Losartan changed to lisinopril. Pt also states she would like to go over the pts imaging results.

## 2022-11-15 ENCOUNTER — PATIENT MESSAGE (OUTPATIENT)
Dept: INTERNAL MEDICINE CLINIC | Age: 47
End: 2022-11-15

## 2022-11-16 ENCOUNTER — TELEPHONE (OUTPATIENT)
Dept: INTERNAL MEDICINE CLINIC | Age: 47
End: 2022-11-16

## 2022-11-16 NOTE — TELEPHONE ENCOUNTER
From: Marcial Keller  To: Dr. Bry Orourke  Sent: 11/15/2022 6:42 AM EST  Subject: Bloop pressure meds    My blood pressure is still running in the 140s and 90s in the morning. Blood pressure is still high. Thank you

## 2022-11-16 NOTE — TELEPHONE ENCOUNTER
Patient's spouse contacted office reporting that he is still experiencing high blood pressure. 147/91 was his most recent reading. Please advise.

## 2022-11-23 ENCOUNTER — OFFICE VISIT (OUTPATIENT)
Dept: INTERNAL MEDICINE CLINIC | Age: 47
End: 2022-11-23

## 2022-11-23 VITALS
WEIGHT: 235 LBS | SYSTOLIC BLOOD PRESSURE: 142 MMHG | RESPIRATION RATE: 20 BRPM | HEIGHT: 73 IN | OXYGEN SATURATION: 98 % | HEART RATE: 84 BPM | DIASTOLIC BLOOD PRESSURE: 94 MMHG | BODY MASS INDEX: 31.14 KG/M2

## 2022-11-23 DIAGNOSIS — E78.2 MIXED HYPERLIPIDEMIA: ICD-10-CM

## 2022-11-23 DIAGNOSIS — R73.03 PREDIABETES: ICD-10-CM

## 2022-11-23 DIAGNOSIS — I10 ESSENTIAL HYPERTENSION: Primary | ICD-10-CM

## 2022-11-23 RX ORDER — LISINOPRIL 40 MG/1
40 TABLET ORAL DAILY
Qty: 30 TABLET | Refills: 0 | Status: SHIPPED | OUTPATIENT
Start: 2022-11-23

## 2022-11-23 RX ORDER — HYDROCHLOROTHIAZIDE 12.5 MG/1
12.5 CAPSULE, GELATIN COATED ORAL EVERY MORNING
Qty: 30 CAPSULE | Refills: 0 | Status: CANCELLED | OUTPATIENT
Start: 2022-11-23
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
## 2022-11-23 NOTE — PROGRESS NOTES
Patient states he could not get labs done. He is still having high bp readings. He tried taking two hctz but he is a  and he was peeing all the time.

## 2022-12-07 ENCOUNTER — TELEMEDICINE (OUTPATIENT)
Dept: INTERNAL MEDICINE CLINIC | Age: 47
End: 2022-12-07

## 2022-12-07 DIAGNOSIS — I10 ESSENTIAL HYPERTENSION: ICD-10-CM

## 2022-12-07 RX ORDER — LISINOPRIL 40 MG/1
40 TABLET ORAL DAILY
Qty: 90 TABLET | Refills: 0 | Status: SHIPPED | OUTPATIENT
Start: 2022-12-07
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
## 2023-01-19 ENCOUNTER — OFFICE VISIT (OUTPATIENT)
Dept: NEUROLOGY | Age: 48
End: 2023-01-19

## 2023-01-19 VITALS
DIASTOLIC BLOOD PRESSURE: 84 MMHG | HEART RATE: 72 BPM | SYSTOLIC BLOOD PRESSURE: 122 MMHG | WEIGHT: 234 LBS | BODY MASS INDEX: 31.69 KG/M2 | OXYGEN SATURATION: 96 % | HEIGHT: 72 IN

## 2023-01-19 DIAGNOSIS — G43.109 MIGRAINE WITH AURA AND WITHOUT STATUS MIGRAINOSUS, NOT INTRACTABLE: ICD-10-CM

## 2023-01-19 DIAGNOSIS — H49.10 TROCHLEAR NERVE PALSY, UNSPECIFIED LATERALITY: ICD-10-CM

## 2023-01-19 DIAGNOSIS — G24.5 BLEPHAROSPASM OF RIGHT EYE: ICD-10-CM

## 2023-01-19 DIAGNOSIS — G44.209 TENSION HEADACHE: Primary | ICD-10-CM

## 2023-01-19 RX ORDER — RIZATRIPTAN BENZOATE 10 MG/1
10 TABLET, ORALLY DISINTEGRATING ORAL
Qty: 10 TABLET | Refills: 3 | Status: SHIPPED | OUTPATIENT
Start: 2023-01-19 | End: 2023-01-19

## 2023-01-19 NOTE — PATIENT INSTRUCTIONS
Please contact our office around 4/18/2023 to get your follow up appointment made. Our scheduling phone number is [phone]. Thank you!

## 2023-03-21 DIAGNOSIS — I10 ESSENTIAL HYPERTENSION: ICD-10-CM

## 2023-03-21 RX ORDER — LISINOPRIL 40 MG/1
40 TABLET ORAL DAILY
Qty: 90 TABLET | Refills: 0 | Status: SHIPPED | OUTPATIENT
Start: 2023-03-21

## 2023-06-23 ENCOUNTER — OFFICE VISIT (OUTPATIENT)
Dept: FAMILY MEDICINE CLINIC | Age: 48
End: 2023-06-23

## 2023-06-23 VITALS
WEIGHT: 235.5 LBS | HEIGHT: 73 IN | SYSTOLIC BLOOD PRESSURE: 124 MMHG | BODY MASS INDEX: 31.21 KG/M2 | OXYGEN SATURATION: 95 % | HEART RATE: 76 BPM | DIASTOLIC BLOOD PRESSURE: 80 MMHG

## 2023-06-23 DIAGNOSIS — E78.2 MIXED HYPERLIPIDEMIA: Primary | ICD-10-CM

## 2023-06-23 DIAGNOSIS — I10 ESSENTIAL HYPERTENSION: ICD-10-CM

## 2023-06-23 DIAGNOSIS — G43.109 MIGRAINE WITH AURA AND WITHOUT STATUS MIGRAINOSUS, NOT INTRACTABLE: ICD-10-CM

## 2023-06-23 RX ORDER — LISINOPRIL 40 MG/1
40 TABLET ORAL DAILY
Qty: 90 TABLET | Refills: 1 | Status: SHIPPED | OUTPATIENT
Start: 2023-06-23

## 2023-06-23 ASSESSMENT — PATIENT HEALTH QUESTIONNAIRE - PHQ9
SUM OF ALL RESPONSES TO PHQ9 QUESTIONS 1 & 2: 0
SUM OF ALL RESPONSES TO PHQ QUESTIONS 1-9: 0
SUM OF ALL RESPONSES TO PHQ QUESTIONS 1-9: 0
2. FEELING DOWN, DEPRESSED OR HOPELESS: 0
SUM OF ALL RESPONSES TO PHQ QUESTIONS 1-9: 0
1. LITTLE INTEREST OR PLEASURE IN DOING THINGS: 0
SUM OF ALL RESPONSES TO PHQ QUESTIONS 1-9: 0

## 2023-06-23 NOTE — PROGRESS NOTES
2023    Leonard Boland    Chief Complaint   Patient presents with    Established New Doctor    Medication Refill       HPI  History was obtained from patient. Trupti Panchal is a 53 y.o. male with a PMHx as listed below who presents today to establish care. No acute complaitns former patient Dr. Johnny De Luna    Bp excellent on current regimen. Patient has migraines monthlly, fair control on maxalt    Social:  Works [employer],     1. Mixed hyperlipidemia    2. Essential hypertension    3. Migraine with aura and without status migrainosus, not intractable             REVIEW OF SYMPTOMS    Review of Systems   Constitutional:  Negative for chills and fatigue. HENT:  Negative for congestion and sore throat. Respiratory:  Negative for shortness of breath and wheezing. Cardiovascular:  Negative for chest pain and palpitations. Gastrointestinal:  Negative for abdominal pain and nausea. Genitourinary:  Negative for frequency and urgency. Neurological:  Negative for light-headedness.      PAST MEDICAL HISTORY  Past Medical History:   Diagnosis Date    Hypertension     Mixed hyperlipidemia 2021       FAMILY HISTORY  Family History   Problem Relation Age of Onset    Diabetes Mother     High Blood Pressure Mother     Colon Cancer Maternal Grandmother        SOCIAL HISTORY  Social History     Socioeconomic History    Marital status:      Spouse name: None    Number of children: None    Years of education: None    Highest education level: None   Tobacco Use    Smoking status: Former     Packs/day: 0.75     Types: Cigarettes     Start date: 13     Quit date: 2018     Years since quittin.4    Smokeless tobacco: Current     Types: Snuff   Substance and Sexual Activity    Alcohol use: Yes     Comment: occasionally    Drug use: No    Sexual activity: Yes     Partners: Female     Social Determinants of Health     Financial Resource Strain: Low Risk     Difficulty of Paying Living Expenses: Not

## 2023-06-25 ASSESSMENT — ENCOUNTER SYMPTOMS
WHEEZING: 0
SHORTNESS OF BREATH: 0
ABDOMINAL PAIN: 0
NAUSEA: 0
SORE THROAT: 0

## 2023-08-15 ENCOUNTER — OFFICE VISIT (OUTPATIENT)
Dept: NEUROLOGY | Age: 48
End: 2023-08-15

## 2023-08-15 VITALS
BODY MASS INDEX: 31.14 KG/M2 | WEIGHT: 235 LBS | DIASTOLIC BLOOD PRESSURE: 84 MMHG | HEART RATE: 75 BPM | SYSTOLIC BLOOD PRESSURE: 120 MMHG | OXYGEN SATURATION: 97 % | HEIGHT: 73 IN

## 2023-08-15 DIAGNOSIS — G44.209 TENSION HEADACHE: ICD-10-CM

## 2023-08-15 DIAGNOSIS — G24.5 BLEPHAROSPASM OF RIGHT EYE: ICD-10-CM

## 2023-08-15 DIAGNOSIS — H49.10 TROCHLEAR NERVE PALSY, UNSPECIFIED LATERALITY: ICD-10-CM

## 2023-08-15 DIAGNOSIS — G43.109 MIGRAINE WITH AURA AND WITHOUT STATUS MIGRAINOSUS, NOT INTRACTABLE: Primary | ICD-10-CM

## 2023-08-15 RX ORDER — UBROGEPANT 100 MG/1
TABLET ORAL
Qty: 1 TABLET | Refills: 0 | Status: SHIPPED | COMMUNITY
Start: 2023-08-15

## 2023-08-15 RX ORDER — RIMEGEPANT SULFATE 75 MG/75MG
TABLET, ORALLY DISINTEGRATING ORAL
Qty: 4 TABLET | Refills: 0 | Status: SHIPPED | COMMUNITY
Start: 2023-08-15

## 2023-08-15 RX ORDER — UBROGEPANT 100 MG/1
TABLET ORAL
Qty: 2 TABLET | Refills: 0 | COMMUNITY
Start: 2023-08-15 | End: 2023-08-15

## 2023-08-15 NOTE — PATIENT INSTRUCTIONS
Please contact our office around 5/15/24 to get your follow up appointment made for August 2024. Our scheduling phone number is [phone]. Thank you!
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
## 2023-12-26 DIAGNOSIS — I10 ESSENTIAL HYPERTENSION: ICD-10-CM

## 2023-12-26 RX ORDER — LISINOPRIL 40 MG/1
40 TABLET ORAL DAILY
Qty: 90 TABLET | Refills: 1 | Status: SHIPPED | OUTPATIENT
Start: 2023-12-26

## 2024-01-03 ENCOUNTER — OFFICE VISIT (OUTPATIENT)
Dept: FAMILY MEDICINE CLINIC | Age: 49
End: 2024-01-03
Payer: COMMERCIAL

## 2024-01-03 VITALS
BODY MASS INDEX: 32.14 KG/M2 | HEART RATE: 77 BPM | DIASTOLIC BLOOD PRESSURE: 82 MMHG | HEIGHT: 73 IN | SYSTOLIC BLOOD PRESSURE: 118 MMHG | WEIGHT: 242.5 LBS | OXYGEN SATURATION: 96 %

## 2024-01-03 DIAGNOSIS — E55.9 VITAMIN D DEFICIENCY: ICD-10-CM

## 2024-01-03 DIAGNOSIS — G43.109 MIGRAINE WITH AURA AND WITHOUT STATUS MIGRAINOSUS, NOT INTRACTABLE: ICD-10-CM

## 2024-01-03 DIAGNOSIS — E78.2 MIXED HYPERLIPIDEMIA: ICD-10-CM

## 2024-01-03 DIAGNOSIS — I10 ESSENTIAL HYPERTENSION: Primary | ICD-10-CM

## 2024-01-03 DIAGNOSIS — I10 ESSENTIAL HYPERTENSION: ICD-10-CM

## 2024-01-03 PROBLEM — K43.9 EPIGASTRIC HERNIA: Status: RESOLVED | Noted: 2018-03-18 | Resolved: 2024-01-03

## 2024-01-03 LAB
ALBUMIN SERPL-MCNC: 4.8 G/DL
ALP BLD-CCNC: 52 U/L
ALT SERPL-CCNC: 37 U/L
ANION GAP SERPL CALCULATED.3IONS-SCNC: NORMAL MMOL/L
AST SERPL-CCNC: 23 U/L
BILIRUB SERPL-MCNC: 0.6 MG/DL (ref 0.1–1.4)
BUN BLDV-MCNC: 16 MG/DL
CALCIUM SERPL-MCNC: 9.9 MG/DL
CHLORIDE BLD-SCNC: 99 MMOL/L
CHOLESTEROL, FASTING: 256
CO2: 31 MMOL/L
CREAT SERPL-MCNC: 1.2 MG/DL
EGFR: 75
GLUCOSE BLD-MCNC: 102 MG/DL
HDLC SERPL-MCNC: 55 MG/DL (ref 35–70)
LDL CHOLESTEROL CALCULATED: 166 MG/DL (ref 0–160)
POTASSIUM SERPL-SCNC: 4.3 MMOL/L
SODIUM BLD-SCNC: 140 MMOL/L
TOTAL PROTEIN: 6.9
TRIGLYCERIDE, FASTING: 175

## 2024-01-03 PROCEDURE — 99214 OFFICE O/P EST MOD 30 MIN: CPT | Performed by: STUDENT IN AN ORGANIZED HEALTH CARE EDUCATION/TRAINING PROGRAM

## 2024-01-03 PROCEDURE — 3079F DIAST BP 80-89 MM HG: CPT | Performed by: STUDENT IN AN ORGANIZED HEALTH CARE EDUCATION/TRAINING PROGRAM

## 2024-01-03 PROCEDURE — 3074F SYST BP LT 130 MM HG: CPT | Performed by: STUDENT IN AN ORGANIZED HEALTH CARE EDUCATION/TRAINING PROGRAM

## 2024-01-03 ASSESSMENT — ENCOUNTER SYMPTOMS
WHEEZING: 0
NAUSEA: 0
ABDOMINAL PAIN: 0
SHORTNESS OF BREATH: 0
SORE THROAT: 0

## 2024-01-03 NOTE — PROGRESS NOTES
1/10/2024    Peter Lucio    Chief Complaint   Patient presents with    6 Month Follow-Up     Hyperlipidemia        HPI  History was obtained from patient.  Peter is a 48 y.o. male with a PMHx as listed below who presents today for follow up on chronic conditions. No acute complaints.     Headaches stable  Bp excellent    Hx hemorrhoids, at times itching he states it is external does not want examined but might be interested in consult    1. Essential hypertension    2. Migraine with aura and without status migrainosus, not intractable    3. Mixed hyperlipidemia    4. Vitamin D deficiency             REVIEW OF SYMPTOMS    Review of Systems   Constitutional:  Negative for chills and fatigue.   HENT:  Negative for congestion and sore throat.    Respiratory:  Negative for shortness of breath and wheezing.    Cardiovascular:  Negative for chest pain and palpitations.   Gastrointestinal:  Negative for abdominal pain and nausea.   Genitourinary:  Negative for frequency and urgency.   Neurological:  Negative for light-headedness.       PAST MEDICAL HISTORY  Past Medical History:   Diagnosis Date    Hypertension     Mixed hyperlipidemia 2021       FAMILY HISTORY  Family History   Problem Relation Age of Onset    Diabetes Mother     High Blood Pressure Mother     Colon Cancer Maternal Grandmother        SOCIAL HISTORY  Social History     Socioeconomic History    Marital status:      Spouse name: None    Number of children: None    Years of education: None    Highest education level: None   Tobacco Use    Smoking status: Former     Current packs/day: 0.00     Average packs/day: 0.7 packs/day for 27.1 years (20.3 ttl pk-yrs)     Types: Cigarettes     Start date:      Quit date: 2018     Years since quittin.9    Smokeless tobacco: Current     Types: Snuff   Substance and Sexual Activity    Alcohol use: Yes     Comment: occasionally    Drug use: No    Sexual activity: Yes     Partners: Female

## 2024-06-16 ENCOUNTER — PATIENT MESSAGE (OUTPATIENT)
Dept: FAMILY MEDICINE CLINIC | Age: 49
End: 2024-06-16

## 2024-06-16 DIAGNOSIS — I10 ESSENTIAL HYPERTENSION: Primary | ICD-10-CM

## 2024-06-16 DIAGNOSIS — R53.83 OTHER FATIGUE: ICD-10-CM

## 2024-06-16 DIAGNOSIS — E78.2 MIXED HYPERLIPIDEMIA: ICD-10-CM

## 2024-06-18 NOTE — TELEPHONE ENCOUNTER
From: Peter Lucio  To: Dr. Kenyatta Bess  Sent: 6/16/2024 11:25 AM EDT  Subject: Blood work    I have an appointment coming up July 2nd and I was just wondering if you could call in to [facility] for them to test my testosterone levels so you could have the results before my appointment thank you have a good day

## 2024-07-02 ENCOUNTER — OFFICE VISIT (OUTPATIENT)
Dept: FAMILY MEDICINE CLINIC | Age: 49
End: 2024-07-02

## 2024-07-02 VITALS
SYSTOLIC BLOOD PRESSURE: 124 MMHG | WEIGHT: 234 LBS | DIASTOLIC BLOOD PRESSURE: 76 MMHG | OXYGEN SATURATION: 98 % | HEART RATE: 59 BPM | BODY MASS INDEX: 31.69 KG/M2 | HEIGHT: 72 IN

## 2024-07-02 DIAGNOSIS — R53.83 OTHER FATIGUE: Primary | ICD-10-CM

## 2024-07-02 DIAGNOSIS — G43.109 MIGRAINE WITH AURA AND WITHOUT STATUS MIGRAINOSUS, NOT INTRACTABLE: ICD-10-CM

## 2024-07-02 DIAGNOSIS — R73.01 IMPAIRED FASTING GLUCOSE: ICD-10-CM

## 2024-07-02 DIAGNOSIS — I10 ESSENTIAL HYPERTENSION: ICD-10-CM

## 2024-07-02 DIAGNOSIS — E78.2 MIXED HYPERLIPIDEMIA: ICD-10-CM

## 2024-07-02 RX ORDER — RIZATRIPTAN BENZOATE 10 MG/1
10 TABLET, ORALLY DISINTEGRATING ORAL 2 TIMES DAILY PRN
Qty: 27 TABLET | Refills: 1 | Status: SHIPPED | OUTPATIENT
Start: 2024-07-02

## 2024-07-02 RX ORDER — LISINOPRIL 40 MG/1
40 TABLET ORAL DAILY
Qty: 90 TABLET | Refills: 1 | Status: SHIPPED | OUTPATIENT
Start: 2024-07-02

## 2024-07-02 SDOH — ECONOMIC STABILITY: FOOD INSECURITY: WITHIN THE PAST 12 MONTHS, THE FOOD YOU BOUGHT JUST DIDN'T LAST AND YOU DIDN'T HAVE MONEY TO GET MORE.: NEVER TRUE

## 2024-07-02 SDOH — ECONOMIC STABILITY: FOOD INSECURITY: WITHIN THE PAST 12 MONTHS, YOU WORRIED THAT YOUR FOOD WOULD RUN OUT BEFORE YOU GOT MONEY TO BUY MORE.: NEVER TRUE

## 2024-07-02 SDOH — ECONOMIC STABILITY: HOUSING INSECURITY
IN THE LAST 12 MONTHS, WAS THERE A TIME WHEN YOU DID NOT HAVE A STEADY PLACE TO SLEEP OR SLEPT IN A SHELTER (INCLUDING NOW)?: NO

## 2024-07-02 SDOH — ECONOMIC STABILITY: INCOME INSECURITY: HOW HARD IS IT FOR YOU TO PAY FOR THE VERY BASICS LIKE FOOD, HOUSING, MEDICAL CARE, AND HEATING?: NOT HARD AT ALL

## 2024-07-02 ASSESSMENT — PATIENT HEALTH QUESTIONNAIRE - PHQ9
SUM OF ALL RESPONSES TO PHQ QUESTIONS 1-9: 0
1. LITTLE INTEREST OR PLEASURE IN DOING THINGS: NOT AT ALL
SUM OF ALL RESPONSES TO PHQ QUESTIONS 1-9: 0
SUM OF ALL RESPONSES TO PHQ QUESTIONS 1-9: 0
2. FEELING DOWN, DEPRESSED OR HOPELESS: NOT AT ALL
SUM OF ALL RESPONSES TO PHQ QUESTIONS 1-9: 0
SUM OF ALL RESPONSES TO PHQ9 QUESTIONS 1 & 2: 0

## 2024-07-02 NOTE — PROGRESS NOTES
Cardiovascular:      Rate and Rhythm: Normal rate and regular rhythm.      Pulses: Normal pulses.      Heart sounds: No murmur heard.     No friction rub. No gallop.   Pulmonary:      Effort: Pulmonary effort is normal.      Breath sounds: Normal breath sounds.   Skin:     General: Skin is warm and dry.   Neurological:      General: No focal deficit present.      Mental Status: He is alert.   Psychiatric:         Mood and Affect: Mood normal.         Behavior: Behavior normal.         ASSESSMENT & PLAN    1. Essential hypertension    - lisinopril (PRINIVIL;ZESTRIL) 40 MG tablet; Take 1 tablet by mouth daily  Dispense: 90 tablet; Refill: 1  - Vitamin B12 & Folate; Future  - TSH with Reflex to FT4 ([place] Only); Future    2. Migraine with aura and without status migrainosus, not intractable    - rizatriptan (MAXALT-MLT) 10 MG disintegrating tablet; Take 1 tablet by mouth 2 times daily as needed for Migraine May repeat in 2 hours if needed  Dispense: 27 tablet; Refill: 1    3. Other fatigue    - Vitamin B12 & Folate; Future  - TSH with Reflex to FT4 ([place] Only); Future    4. Mixed hyperlipidemia    - Lipid, Fasting; Future    5. Impaired fasting glucose    - Hemoglobin A1C; Future    Bp stable  Continue current regimen  F/u labs    Hemoglobin A1C   Date Value Ref Range Status   09/21/2022 5.5 4.8 - 5.6 % Final     Comment:                                                            .           Prediabetes: 5.7 - 6.4           Diabetes: >6.4           Glycemic control for adults with diabetes: <7.0         No follow-ups on file.         Electronically signed by Kenyatta Bess DO on 7/14/2024

## 2024-07-03 ENCOUNTER — PATIENT MESSAGE (OUTPATIENT)
Dept: FAMILY MEDICINE CLINIC | Age: 49
End: 2024-07-03

## 2024-07-03 DIAGNOSIS — G43.109 MIGRAINE WITH AURA AND WITHOUT STATUS MIGRAINOSUS, NOT INTRACTABLE: Primary | ICD-10-CM

## 2024-07-09 RX ORDER — SUMATRIPTAN 50 MG/1
50 TABLET, FILM COATED ORAL DAILY PRN
Qty: 27 TABLET | Refills: 2 | Status: SHIPPED | OUTPATIENT
Start: 2024-07-09 | End: 2024-10-07

## 2024-07-09 NOTE — TELEPHONE ENCOUNTER
Lakisha Clarke LPN 7/5/2024 10:01 AM EDT    Pt needs Rx of Imitrex to [facility] [place]        ----- Message -----  From: Peter Lucio  Sent: 7/3/2024 5:54 PM EDT  To: [facility] Clinical Staff  Subject: Migraines     They went ahead and printed out my blood work so I can take that to the place I need to go thank you but the migraine medicine is the Imitrex

## 2024-07-12 LAB
ALBUMIN: 4.9 G/DL
ALP BLD-CCNC: 56 U/L
ALT SERPL-CCNC: 26 U/L
ANION GAP SERPL CALCULATED.3IONS-SCNC: NORMAL MMOL/L
AST SERPL-CCNC: 19 U/L
BILIRUB SERPL-MCNC: 0.2 MG/DL (ref 0.1–1.4)
BUN BLDV-MCNC: 21 MG/DL
CALCIUM SERPL-MCNC: 9.7 MG/DL
CHLORIDE BLD-SCNC: 99 MMOL/L
CHOLESTEROL, FASTING: 229
CO2: 20 MMOL/L
CREAT SERPL-MCNC: 1 MG/DL
ESTIMATED AVERAGE GLUCOSE: NORMAL
FOLATE: 14.3
GFR, ESTIMATED: 93
GLUCOSE BLD-MCNC: 114 MG/DL
HBA1C MFR BLD: 5.8 %
HDLC SERPL-MCNC: 56 MG/DL (ref 35–70)
LDL CHOLESTEROL: 144
POTASSIUM SERPL-SCNC: 4.8 MMOL/L
SODIUM BLD-SCNC: 137 MMOL/L
TESTOSTERONE FREE PERCENT: NORMAL
TESTOSTERONE FREE: 8.3
TESTOSTERONE TOTAL: 262
TOTAL PROTEIN: 7.1 G/DL (ref 6.4–8.2)
TRIGLYCERIDE, FASTING: 161
VITAMIN B-12: 698

## 2024-07-15 DIAGNOSIS — I10 ESSENTIAL HYPERTENSION: ICD-10-CM

## 2024-07-15 DIAGNOSIS — E78.2 MIXED HYPERLIPIDEMIA: ICD-10-CM

## 2024-07-15 DIAGNOSIS — R53.83 OTHER FATIGUE: ICD-10-CM

## 2024-07-15 DIAGNOSIS — R73.01 IMPAIRED FASTING GLUCOSE: ICD-10-CM

## 2024-07-22 DIAGNOSIS — Z80.42 FAMILY HX OF PROSTATE CANCER: ICD-10-CM

## 2024-07-22 DIAGNOSIS — N40.0 BENIGN PROSTATIC HYPERPLASIA WITHOUT LOWER URINARY TRACT SYMPTOMS: Primary | ICD-10-CM

## 2024-07-22 DIAGNOSIS — R79.89 LOW TESTOSTERONE IN MALE: Primary | ICD-10-CM

## 2024-07-24 LAB
PROSTATE SPECIFIC ANTIGEN: 0.4 NG/ML
TESTOSTERONE FREE PERCENT: NORMAL
TESTOSTERONE FREE: NORMAL
TESTOSTERONE TOTAL: 322

## 2024-07-26 DIAGNOSIS — R79.89 LOW TESTOSTERONE IN MALE: ICD-10-CM

## 2024-07-26 DIAGNOSIS — N40.0 BENIGN PROSTATIC HYPERPLASIA WITHOUT LOWER URINARY TRACT SYMPTOMS: ICD-10-CM

## 2024-07-26 DIAGNOSIS — Z80.42 FAMILY HX OF PROSTATE CANCER: ICD-10-CM

## 2024-07-26 RX ORDER — TESTOSTERONE CYPIONATE 200 MG/ML
200 INJECTION, SOLUTION INTRAMUSCULAR
Qty: 1 ML | Refills: 0 | COMMUNITY
Start: 2024-07-26 | End: 2024-07-26

## 2024-07-26 RX ORDER — SYRINGE-NEEDLE,INSULIN,0.5 ML 27GX1/2"
SYRINGE, EMPTY DISPOSABLE MISCELLANEOUS
Refills: 0 | COMMUNITY

## 2024-07-26 RX ORDER — NEEDLES, SAFETY 18GX1 1/2"
NEEDLE, DISPOSABLE MISCELLANEOUS
Refills: 0 | COMMUNITY

## 2024-07-26 NOTE — RESULT ENCOUNTER NOTE
Patient has mildly low testosterone. Given his symptoms we can start TRT. Does he have a preference injection or cream?

## 2024-07-29 DIAGNOSIS — R79.89 LOW TESTOSTERONE IN MALE: ICD-10-CM

## 2024-07-29 RX ORDER — TESTOSTERONE CYPIONATE 200 MG/ML
200 INJECTION, SOLUTION INTRAMUSCULAR
Qty: 2 ML | Refills: 2 | Status: SHIPPED | OUTPATIENT
Start: 2024-07-29 | End: 2024-10-27

## 2024-07-30 DIAGNOSIS — R79.89 LOW TESTOSTERONE IN MALE: ICD-10-CM

## 2024-07-30 RX ORDER — NEEDLES, SAFETY 18GX1 1/2"
1 NEEDLE, DISPOSABLE MISCELLANEOUS
Qty: 6 EACH | Refills: 1 | Status: SHIPPED | OUTPATIENT
Start: 2024-07-30

## 2024-10-19 DIAGNOSIS — R79.89 LOW TESTOSTERONE IN MALE: ICD-10-CM

## 2024-10-21 RX ORDER — TESTOSTERONE CYPIONATE 200 MG/ML
200 INJECTION, SOLUTION INTRAMUSCULAR
Qty: 2 ML | Refills: 2 | Status: SHIPPED | OUTPATIENT
Start: 2024-10-21 | End: 2025-01-19

## 2024-10-25 ENCOUNTER — OFFICE VISIT (OUTPATIENT)
Dept: FAMILY MEDICINE CLINIC | Age: 49
End: 2024-10-25
Payer: COMMERCIAL

## 2024-10-25 VITALS
WEIGHT: 239 LBS | DIASTOLIC BLOOD PRESSURE: 88 MMHG | HEIGHT: 72 IN | HEART RATE: 76 BPM | BODY MASS INDEX: 32.37 KG/M2 | OXYGEN SATURATION: 99 % | SYSTOLIC BLOOD PRESSURE: 120 MMHG

## 2024-10-25 DIAGNOSIS — R79.89 LOW TESTOSTERONE IN MALE: Primary | ICD-10-CM

## 2024-10-25 PROCEDURE — 99213 OFFICE O/P EST LOW 20 MIN: CPT | Performed by: STUDENT IN AN ORGANIZED HEALTH CARE EDUCATION/TRAINING PROGRAM

## 2024-10-25 PROCEDURE — 3079F DIAST BP 80-89 MM HG: CPT | Performed by: STUDENT IN AN ORGANIZED HEALTH CARE EDUCATION/TRAINING PROGRAM

## 2024-10-25 PROCEDURE — 3074F SYST BP LT 130 MM HG: CPT | Performed by: STUDENT IN AN ORGANIZED HEALTH CARE EDUCATION/TRAINING PROGRAM

## 2024-10-25 NOTE — PROGRESS NOTES
Rhythm: Normal rate and regular rhythm.      Pulses: Normal pulses.      Heart sounds: No murmur heard.     No friction rub. No gallop.   Skin:     General: Skin is warm and dry.   Neurological:      General: No focal deficit present.      Mental Status: He is alert.   Psychiatric:         Mood and Affect: Mood normal.         Behavior: Behavior normal.         ASSESSMENT & PLAN    1. Low testosterone in male  - Testosterone, free, total; Future  - CBC with Auto Differential; Future      Continue current testosterone,   Doing well on current regimen  F/u labs  Return in about 4 months (around 2/25/2025) for please schedule testosterone nursing visit on Nov 4th.         Electronically signed by Kenyatta Bess DO on 11/20/2024

## 2024-11-04 ENCOUNTER — NURSE ONLY (OUTPATIENT)
Dept: FAMILY MEDICINE CLINIC | Age: 49
End: 2024-11-04
Payer: COMMERCIAL

## 2024-11-04 DIAGNOSIS — R79.89 LOW TESTOSTERONE IN MALE: Primary | ICD-10-CM

## 2024-11-04 PROCEDURE — 96372 THER/PROPH/DIAG INJ SC/IM: CPT | Performed by: STUDENT IN AN ORGANIZED HEALTH CARE EDUCATION/TRAINING PROGRAM

## 2024-11-04 RX ORDER — TESTOSTERONE CYPIONATE 200 MG/ML
200 INJECTION, SOLUTION INTRAMUSCULAR ONCE
Status: COMPLETED | OUTPATIENT
Start: 2024-11-04 | End: 2024-11-04

## 2024-11-04 RX ORDER — NEEDLES, DISPOSABLE 25GX5/8"
NEEDLE, DISPOSABLE MISCELLANEOUS
Qty: 6 EACH | Refills: 1 | Status: SHIPPED | OUTPATIENT
Start: 2024-11-04

## 2024-11-04 NOTE — PROGRESS NOTES
Patient in for testosterone injection.  Has been giving to himself at home but Dr Bess wanted him to come in for an injection.  Patient explained procedure and demonstrated injection location.  Parviz was able to correctly draw up and self inject medication.  Advised to continue injections at home.  Requested refill on needles and syringes.  Will send to pharmacy.  Pointers given for need disposal.

## 2024-11-20 ASSESSMENT — ENCOUNTER SYMPTOMS
ABDOMINAL PAIN: 0
SHORTNESS OF BREATH: 0
SORE THROAT: 0
NAUSEA: 0
WHEEZING: 0

## 2024-12-27 DIAGNOSIS — R79.89 LOW TESTOSTERONE IN MALE: ICD-10-CM

## 2024-12-27 LAB
BASOPHILS ABSOLUTE: 0.1 /ΜL
BASOPHILS RELATIVE PERCENT: 1 %
EOSINOPHILS ABSOLUTE: 0.2 /ΜL
EOSINOPHILS RELATIVE PERCENT: 3 %
HCT VFR BLD CALC: 46 % (ref 41–53)
HEMOGLOBIN: 15 G/DL (ref 13.5–17.5)
LYMPHOCYTES ABSOLUTE: 1.8 /ΜL
LYMPHOCYTES RELATIVE PERCENT: 26 %
MCH RBC QN AUTO: 27.9 PG
MCHC RBC AUTO-ENTMCNC: 32.6 G/DL
MCV RBC AUTO: 86 FL
MONOCYTES ABSOLUTE: 0.8 /ΜL
MONOCYTES RELATIVE PERCENT: 11 %
NEUTROPHILS ABSOLUTE: 4.2 /ΜL
NEUTROPHILS RELATIVE PERCENT: 58 %
PDW BLD-RTO: 13.5 %
PLATELET # BLD: 305 K/ΜL
PMV BLD AUTO: NORMAL FL
RBC # BLD: 5.38 10^6/ΜL
TESTOSTERONE FREE PERCENT: NORMAL
TESTOSTERONE FREE: NORMAL
TESTOSTERONE TOTAL: 373
WBC # BLD: 7.2 10^3/ML

## 2025-01-02 DIAGNOSIS — I10 ESSENTIAL HYPERTENSION: ICD-10-CM

## 2025-01-02 RX ORDER — LISINOPRIL 40 MG/1
40 TABLET ORAL DAILY
Qty: 90 TABLET | Refills: 1 | Status: SHIPPED | OUTPATIENT
Start: 2025-01-02

## 2025-01-02 NOTE — TELEPHONE ENCOUNTER
LV   10/25/24    [place]  2/325    [facility]       Note:     Patient had to reschedule appt for 1/3/25 because he tested positive for COVID----he said he feels congested only and does not need anything called in for him at this time.

## 2025-01-11 DIAGNOSIS — R79.89 LOW TESTOSTERONE IN MALE: ICD-10-CM

## 2025-01-13 DIAGNOSIS — R79.89 LOW TESTOSTERONE IN MALE: ICD-10-CM

## 2025-01-13 RX ORDER — TESTOSTERONE CYPIONATE 200 MG/ML
INJECTION, SOLUTION INTRAMUSCULAR
Qty: 2 ML | Refills: 2 | Status: SHIPPED | OUTPATIENT
Start: 2025-01-13 | End: 2025-01-13 | Stop reason: SDUPTHER

## 2025-01-13 RX ORDER — TESTOSTERONE CYPIONATE 200 MG/ML
200 INJECTION, SOLUTION INTRAMUSCULAR
Qty: 2 ML | Refills: 0 | Status: SHIPPED | OUTPATIENT
Start: 2025-01-13 | End: 2025-01-14 | Stop reason: SDUPTHER

## 2025-01-14 DIAGNOSIS — R79.89 LOW TESTOSTERONE IN MALE: ICD-10-CM

## 2025-01-14 RX ORDER — TESTOSTERONE CYPIONATE 200 MG/ML
200 INJECTION, SOLUTION INTRAMUSCULAR
Qty: 2 ML | Refills: 0 | Status: SHIPPED | OUTPATIENT
Start: 2025-01-14 | End: 2025-02-11

## 2025-01-14 NOTE — TELEPHONE ENCOUNTER
Patient needs the Testosterone to go to [facility] on [address] so it doesn't cost him $100.00.    Please resend to [facility] on [address]----------Not [facility].

## 2025-04-02 DIAGNOSIS — I10 ESSENTIAL HYPERTENSION: ICD-10-CM

## 2025-04-03 RX ORDER — LISINOPRIL 40 MG/1
40 TABLET ORAL DAILY
Qty: 30 TABLET | Refills: 0 | Status: SHIPPED | OUTPATIENT
Start: 2025-04-03

## 2025-04-03 NOTE — TELEPHONE ENCOUNTER
Appt 0  Lv 10/25/24    Requested Prescriptions     Signed Prescriptions Disp Refills    lisinopril (PRINIVIL;ZESTRIL) 40 MG tablet 30 tablet 0     Sig: Take 1 tablet by mouth daily Need an appointment     Authorizing Provider: ASHLEY FLORES     Ordering User: SANDY ADAM